# Patient Record
Sex: MALE | Employment: FULL TIME | URBAN - METROPOLITAN AREA
[De-identification: names, ages, dates, MRNs, and addresses within clinical notes are randomized per-mention and may not be internally consistent; named-entity substitution may affect disease eponyms.]

---

## 2017-12-15 ENCOUNTER — HOSPITAL ENCOUNTER (OUTPATIENT)
Dept: GENERAL RADIOLOGY | Age: 52
Discharge: HOME | End: 2017-12-15
Payer: COMMERCIAL

## 2017-12-15 DIAGNOSIS — M54.50 LOW BACK PAIN: ICD-10-CM

## 2017-12-15 PROCEDURE — 72100 X-RAY EXAM L-S SPINE 2/3 VWS: CPT

## 2017-12-21 ENCOUNTER — HOSPITAL ENCOUNTER (OUTPATIENT)
Dept: PHYSICAL THERAPY | Age: 52
Discharge: HOME | End: 2017-12-21
Payer: COMMERCIAL

## 2017-12-21 PROCEDURE — 97140 MANUAL THERAPY 1/> REGIONS: CPT

## 2017-12-21 PROCEDURE — 97112 NEUROMUSCULAR REEDUCATION: CPT

## 2017-12-21 PROCEDURE — 97162 PT EVAL MOD COMPLEX 30 MIN: CPT

## 2017-12-21 NOTE — PROGRESS NOTES
PT DAILY TREATMENT NOTE -    Patient Name: Daysi Cea  Date:2017  : 1965  [x]  Patient  Verified  Payor: BLUE CROSS / Plan: [facility] [place] / Product Type: PPO /    In time:945  Out time:1043  Total Treatment Time (min): 62  Visit #: 1 of 8    Treatment Area: Low back pain [M54.5]    SUBJECTIVE  Pain Level (0-10 scale): 4  Any medication changes, allergies to medications, adverse drug reactions, diagnosis change, or new procedure performed?: [x] No    [] Yes (see summary sheet for update)  Subjective functional status/changes:   [] No changes reported  Accident with back at 12 y/o - intermittent issues with back ever since. 5 weeks ago pain was exacerbated without injury. The only activity change was a decrease in frequency of going to the gym - now patient is unable to go to gym or tolerate >30 minutes of standing activity. Gym workout - heavy machine circuits- low rep, high weight. OBJECTIVE  23 min Neuromuscular Re-education:  [x]  See flow sheet :   Rationale: increase strength, improve coordination, improve balance and increase proprioception  to improve the patients ability to revive activation of anterior chain from c/s to l/s and unlock activation of TA.    8 min Manual Therapy:  (B) inferior and posterior hip mobilization to increase (B) hip flexion to assist with lumbar flexion ROM   Rationale: decrease pain, increase ROM and increase tissue extensibility to effectively stretch back extensors. With   [] TE   [] TA   [] neuro   [] other: Patient Education: [x] Review HEP    [] Progressed/Changed HEP based on:   [] positioning   [] body mechanics   [] transfers   [] heat/ice application    [] other:      Other Objective/Functional Measures: refer to eval     Pain Level (0-10 scale) post treatment: 1    ASSESSMENT/Changes in Function: Excellent response to flexion based approached. Unable to replicate any neuro symptoms.   Pt would benefit from PT to reset postural awareness and activate local muscle strength    Patient will continue to benefit from skilled PT services to modify and progress therapeutic interventions, address functional mobility deficits, address ROM deficits, address strength deficits, analyze and address soft tissue restrictions, analyze and cue movement patterns, analyze and modify body mechanics/ergonomics and assess and modify postural abnormalities to attain remaining goals. [x]  See Plan of Care  []  See progress note/recertification  []  See Discharge Summary         Progress towards goals / Updated goals:  Short Term Goals: To be accomplished in 2 weeks:  1. Pt will be compliant with HEP 2x/day to improve postural alignment in preparation for functional core training to affect standing tolerance for ADLs  2. Pt will be able to complete supine abdominal program consistently demonstrating a diaphragmatic breathing pattern to break thru compensatory patterns for greater functional activation of local muscles with standing activity.       Long Term Goals: To be accomplished in 4 weeks:  1. Pt will improve FOTO to 68 to resume safe working out in the gym  2. Pt will improve standing tolerance to > 1 hour to increase ease of ADLs  3. Pt will demonstrate a 5 cm difference in lower thoracic circumference between maximal exhalation and maximal inhalation to alleviate diaphragmatic spasm to activate TA during functional postures.       PLAN  []  Upgrade activities as tolerated     [x]  Continue plan of care  []  Update interventions per flow sheet       []  Discharge due to:_  []  Other:_      Donita Juarez PT 12/21/2017  11:00 AM    Future Appointments  Date Time Provider Charlie Louis   12/27/2017 12:00 PM Donita Juarez PT [facility]   12/29/2017 3:30 PM Kalina Lopez [facility]   1/3/2018 11:30 AM Donita Juarez PT [facility]   1/5/2018 11:30 AM Kalina Lopez [facility]   1/10/2018 11:30 AM Grace Dixon Norah, PT [facility]   1[facility]12/2018 [facility]:30 [facility]   1/17/2018 11:30 AM Cliff Cantu, PT [facility]

## 2017-12-21 NOTE — PROGRESS NOTES
[facility]  [address]  [place], [address]  [phone] fax    Plan of Care/ Statement of Necessity for Physical Therapy Services    Patient name: Taras Son Start of Care: 2017   Referral source: Giselle Mendoza MD : 1965    Medical Diagnosis: Low back pain [M54.5]   Onset Date:5 weeks ago    Treatment Diagnosis: back pain   Prior Hospitalization: see medical history Provider#: [id number]   Medications: Verified on Patient summary List    Comorbidities: psoriatic arthritis, back pain, HA, sleep dysfunction   Prior Level of Function: Unlimited standing tolerance, no LE symptoms. Recreational exerciser up unitl 2 months ago - heavy machine based weight circuits maxing out on doable weights for all global muscle groups. The Plan of Care and following information is based on the information from the initial evaluation. Assessment/ key information: Pt presents to PT with insidious onset of back pain with (B) LE involvement when standing for >30 minutes. Pt has significant postural abnormalities: forward head, dowagers hump, excessive thoracic kyphosis, and a hyperlordosis. He stands with abdominal wall pushed out without any evidence of TA activation or that of any anterior chain muscles. Neurological exam was WNL and there was no provocation of LE symptoms. Continue PT for neurological reboot of local muscle systems - anterior chain emphasis to prepare patient for functional strength training to eliminate LBP and to prepare patient to resume safe functional exercise.     Evaluation Complexity History MEDIUM  Complexity : 1-2 comorbidities / personal factors will impact the outcome/ POC ; Examination MEDIUM Complexity : 3 Standardized tests and measures addressing body structure, function, activity limitation and / or participation in recreation  ;Presentation MEDIUM Complexity : Evolving with changing characteristics ;Clinical Decision Making MEDIUM Complexity : FOTO score of 26-74  Overall Complexity Rating: MEDIUM  Problem List: pain affecting function, decrease ROM, decrease strength, edema affecting function, impaired gait/ balance, decrease ADL/ functional abilitiies, decrease activity tolerance and decrease flexibility/ joint mobility   Treatment Plan may include any combination of the following: Therapeutic exercise, Therapeutic activities, Neuromuscular re-education, Physical agent/modality, Gait/balance training, Manual therapy and Patient education  Patient / Family readiness to learn indicated by: asking questions, trying to perform skills and interest  Persons(s) to be included in education: patient (P)  Barriers to Learning/Limitations: None  Patient Goal (s): To be able to return to the gym.   Patient Self Reported Health Status: good  Rehabilitation Potential: good    Short Term Goals: To be accomplished in 2 weeks:  1. Pt will be compliant with HEP 2x/day to improve postural alignment in preparation for functional core training to affect standing tolerance for ADLs  2. Pt will be able to complete supine abdominal program consistently demonstrating a diaphragmatic breathing pattern to break thru compensatory patterns for greater functional activation of local muscles with standing activity. Long Term Goals: To be accomplished in 4 weeks:  1. Pt will improve FOTO to 68 to resume safe working out in the gym  2. Pt will improve standing tolerance to > 1 hour to increase ease of ADLs  3. Pt will demonstrate a 5 cm difference in lower thoracic circumference between maximal exhalation and maximal inhalation to alleviate diaphragmatic spasm to activate TA during functional postures. Frequency / Duration: Patient to be seen 2 times per week for 4 weeks.     Patient/ Caregiver education and instruction: Diagnosis, prognosis, self care, activity modification and exercises   [x]  Plan of care has been reviewed with JOSÉ MIGUEL Vasquez, PT 12/21/2017 10:50 AM    ________________________________________________________________________    I certify that the above Therapy Services are being furnished while the patient is under my care. I agree with the treatment plan and certify that this therapy is necessary.     [de-identified] Signature:____________________  Date:____________Time: _________    Please sign and return to [facility] [address] [facility] [place] [address] [place] [address] [address]  [phone] fax

## 2017-12-27 ENCOUNTER — HOSPITAL ENCOUNTER (OUTPATIENT)
Dept: PHYSICAL THERAPY | Age: 52
Discharge: HOME | End: 2017-12-27
Payer: COMMERCIAL

## 2017-12-27 PROCEDURE — 97112 NEUROMUSCULAR REEDUCATION: CPT

## 2017-12-27 NOTE — PROGRESS NOTES
PT DAILY TREATMENT NOTE 8-14    Patient Name: Tin Preston  Date:2017  : 1965  [x]  Patient  Verified  Payor: BLUE CROSS / Plan: [facility] [place] / Product Type: PPO /    In time:1201  Out time:1250  Total Treatment Time (min): 49  Visit #: 2 of 8    Treatment Area: Low back pain [M54.5]    SUBJECTIVE  Pain Level (0-10 scale): 1  Any medication changes, allergies to medications, adverse drug reactions, diagnosis change, or new procedure performed?: [x] No    [] Yes (see summary sheet for update)  Subjective functional status/changes:   [] No changes reported  I have intermittent pain still. I still loosen up in the shower the best.  Clarified some questions regarding HEP    OBJECTIVE    49 min Neuromuscular Re-education:  [x]  See flow sheet :   Rationale: increase strength, improve coordination and increase proprioception  to improve the patients ability to eliminate bracing, improve diaphragmatic movement/breathing, connect entire anterior chain from cervical to pelvic areas           With   [] TE   [] TA   [] neuro   [] other: Patient Education: [x] Review HEP    [] Progressed/Changed HEP based on:   [] positioning   [] body mechanics   [] transfers   [] heat/ice application    [] other:      Other Objective/Functional Measures: 50% improvement in diaphragmatic movement but unable to eliminate all bracing and compensatory breathing issues. Pt has significantly decreased proprioceptive awareness of his anterior chain from the diaphragm to the pubic symphysis. Therapist was able to connect and get appropriate firing but patient was not able to feel it and fatigued quite quickly with all exercises.        Pain Level (0-10 scale) post treatment: 0    ASSESSMENT/Changes in Function: Pt never had any LBP during treatment and reported all LE symptoms were abolished post treatment    Patient will continue to benefit from skilled PT services to modify and progress therapeutic interventions, address functional mobility deficits, address ROM deficits, address strength deficits, analyze and address soft tissue restrictions, analyze and cue movement patterns, analyze and modify body mechanics/ergonomics and assess and modify postural abnormalities to attain remaining goals.      []  See Plan of Care  []  See progress note/recertification  []  See Discharge Summary         Progress towards goals / Updated goals:  Short Term Goals: To be accomplished in 2 weeks:  1.  Pt will be compliant with HEP 2x/day to improve postural alignment in preparation for functional core training to affect standing tolerance for ADLs  2.  Pt will be able to complete supine abdominal program consistently demonstrating a diaphragmatic breathing pattern to break thru compensatory patterns for greater functional activation of local muscles with standing activity.        Long Term Goals: To be accomplished in 4 weeks:  1.  Pt will improve FOTO to 68 to resume safe working out in the gym  2.  Pt will improve standing tolerance to > 1 hour to increase ease of ADLs  3.  Pt will demonstrate a 5 cm difference in lower thoracic circumference between maximal exhalation and maximal inhalation to alleviate diaphragmatic spasm to activate TA during functional postures.      PLAN  []  Upgrade activities as tolerated     [x]  Continue plan of care  []  Update interventions per flow sheet       []  Discharge due to:_  []  Other:_      Domingo Lópze PT 12/27/2017  12:53 PM    Future Appointments  Date Time Provider Charlie Louis   12/29/2017 3:30 PM Jay GARCIAS [facility]   1/3/2018 11:30 AM SIMA Renee [facility]   1/5/2018 11:30 AM Jay GARCIAS [facility]   1/10/2018 11:30 AM SIMA Renee [facility]   1/12/2018 11:30 AM Jay BOLESPT [facility]   1/17/2018 11:30 AM SIMA [facility]eP[facility]

## 2017-12-29 ENCOUNTER — HOSPITAL ENCOUNTER (OUTPATIENT)
Dept: PHYSICAL THERAPY | Age: 52
Discharge: HOME | End: 2017-12-29
Payer: COMMERCIAL

## 2017-12-29 PROCEDURE — 97112 NEUROMUSCULAR REEDUCATION: CPT

## 2017-12-29 NOTE — PROGRESS NOTES
PT DAILY TREATMENT NOTE 3-16    Patient Name: Monica Suh  Date:2017  : 1965  [x]  Patient  Verified  Payor: BLUE CROSS / Plan:  [facility] [place] / Product Type: PPO /    In time:3:31  Out time:4:10  Total Treatment Time (min): 39  Visit #: 3 of 8    Treatment Area: Low back pain [M54.5]    SUBJECTIVE  Pain Level (0-10 scale): 3/10  Any medication changes, allergies to medications, adverse drug reactions, diagnosis change, or new procedure performed?: [x] No    [] Yes (see summary sheet for update)  Subjective functional status/changes:   [] No changes reported  \"It's a little sore. \"    OBJECTIVE  39 min Neuromuscular Re-education:  [x]  See flow sheet :   Rationale: increase strength, improve coordination, improve balance and increase proprioception  to improve the patients ability to improve anterior chain recruitment     With   [] TE   [] TA   [] neuro   [] other: Patient Education: [x] Review HEP    [] Progressed/Changed HEP based on:   [] positioning   [] body mechanics   [] transfers   [] heat/ice application    [] other:      Other Objective/Functional Measures:   Utilized Superman ball as space corrector with c/s activities    Pain Level (0-10 scale) post treatment: 3    ASSESSMENT/Changes in Function:   Continues to have great difficulty decreasing diaphragmatic spasm though demonstrates fair-good c/s postural form post cueing. Maximal cueing to peform all activites on exhale with flexion activities. Core fatigues very quickly. Patient will continue to benefit from skilled PT services to modify and progress therapeutic interventions, address functional mobility deficits, address ROM deficits, address strength deficits, analyze and address soft tissue restrictions, analyze and cue movement patterns, analyze and modify body mechanics/ergonomics and assess and modify postural abnormalities to attain remaining goals.      []  See Plan of Care  []  See progress note/recertification  [] See Discharge Summary         Progress towards goals / Updated goals:  Short Term Goals: To be accomplished in 2 weeks:  1.  Pt will be compliant with HEP 2x/day to improve postural alignment in preparation for functional core training to affect standing tolerance for ADLs -met per patient report (12/29/2017)  2.  Pt will be able to complete supine abdominal program consistently demonstrating a diaphragmatic breathing pattern to break thru compensatory patterns for greater functional activation of local muscles with standing activity.        Long Term Goals: To be accomplished in 4 weeks:  1.  Pt will improve FOTO to 68 to resume safe working out in the gym  2.  Pt will improve standing tolerance to > 1 hour to increase ease of ADLs  3.  Pt will demonstrate a 5 cm difference in lower thoracic circumference between maximal exhalation and maximal inhalation to alleviate diaphragmatic spasm to activate TA during functional postures.      PLAN  []  Upgrade activities as tolerated     [x]  Continue plan of care  []  Update interventions per flow sheet       []  Discharge due to:_  []  Other:_      Martin Ochoa 12/29/2017  3:28 PM    Future Appointments  Date Time Provider Charlie Louis   12/29/2017 3:30 PM Martin Ochoa [facility]   1/3/2018 11:30 AM Ceferino Loza PT [facility]   1/5/2018 11:30 AM Martin [facility]OL[facility]   1/10/2018 11:30 AM Ceferino Loza PT [facility]   1/12/2018 11:30 AM Martin [facility]   1/17/2018 11:30 AM Ceferino Loza PT [facility]

## 2018-01-03 ENCOUNTER — HOSPITAL ENCOUNTER (OUTPATIENT)
Dept: PHYSICAL THERAPY | Age: 53
Discharge: HOME | End: 2018-01-03
Payer: COMMERCIAL

## 2018-01-03 PROCEDURE — 97112 NEUROMUSCULAR REEDUCATION: CPT

## 2018-01-03 NOTE — PROGRESS NOTES
PT DAILY TREATMENT NOTE 8-    Patient Name: Rosalind Boo  Date:1/3/2018  : 1965  [x]  Patient  Verified  Payor: BLUE CROSS / Plan: [facility] [place] / Product Type: PPO /    In time:1147  Out time:1211  Total Treatment Time (min): 24  Visit #: 4 of 8    Treatment Area: Low back pain [M54.5]    SUBJECTIVE  Pain Level (0-10 scale): 1-2  Any medication changes, allergies to medications, adverse drug reactions, diagnosis change, or new procedure performed?: [x] No    [] Yes (see summary sheet for update)  Subjective functional status/changes:   [] No changes reported  I was pretty stiff in the lower back after last session but I've woken up feeling better today. Pt reports still feeling himself bracing through the diaphragm with ADLs and exercises - particularly during trunk flexion. Pt was very late so treatment was shortened. OBJECTIVE    24 min Neuromuscular Re-education:  [x]  See flow sheet :   Rationale: increase strength, improve coordination, improve balance and increase proprioception  to improve the patients ability to release diaphragm, activate TA and other deep anterior column stabilizers. With   [] TE   [] TA   [] neuro   [] other: Patient Education: [x] Review HEP    [] Progressed/Changed HEP based on:   [] positioning   [] body mechanics   [] transfers   [] heat/ice application    [] other:      Other Objective/Functional Measures:     Kept pt supine on Oov due to time constraints and to work with patient until TA started to activate and diaphragm started to relax. It took about 15 minutes of different movement patterns to get there and then patient's stamina was about 10 minutes. Due to late arrival, treatment ended there. Pt is starting to feel the difference between good and bad muscle recruitment.   We were also successful and repeating an exercise until he was successful and then using static holds of a difficult pattern until he could successfully control the movement, then practiced the movement more dynamically. Post treatment, there was volitional TA recruitment observed. Proprioceptively, patient is still challenged with perceiving the changes. Pain Level (0-10 scale) post treatment: 1    ASSESSMENT/Changes in Function: Pt reported lower back relaxation post treatment. No apparent changes to intermittent LE symptoms. Patient will continue to benefit from skilled PT services to modify and progress therapeutic interventions, address functional mobility deficits, address ROM deficits, address strength deficits, analyze and address soft tissue restrictions, analyze and cue movement patterns, analyze and modify body mechanics/ergonomics, assess and modify postural abnormalities and address imbalance/dizziness to attain remaining goals.      []  See Plan of Care  []  See progress note/recertification  []  See Discharge Summary         Progress towards goals / Updated goals:  Short Term Goals: To be accomplished in 2 weeks:  1.  Pt will be compliant with HEP 2x/day to improve postural alignment in preparation for functional core training to affect standing tolerance for ADLs -met per patient report (12/29/2017)  2.  Pt will be able to complete supine abdominal program consistently demonstrating a diaphragmatic breathing pattern to break thru compensatory patterns for greater functional activation of local muscles with standing activity.   Not met - 15 minutes to breakthrough habitual bracing and guarding techniques to successfully engage TA and reduce hip flexor and diaphragmatic compensation 1/3/2018      Long Term Goals: To be accomplished in 4 weeks:  1.  Pt will improve FOTO to 68 to resume safe working out in the gym  2.  Pt will improve standing tolerance to > 1 hour to increase ease of ADLs  3.  Pt will demonstrate a 5 cm difference in lower thoracic circumference between maximal exhalation and maximal inhalation to alleviate diaphragmatic spasm to activate TA during functional postures.      PLAN  []  Upgrade activities as tolerated     [x]  Continue plan of care  []  Update interventions per flow sheet       []  Discharge due to:_  []  Other:_      Khadra Martinez, PT 1/3/2018  1:33 PM    Future Appointments  Date Time Provider Charlie Louis   1/5/2018 11:30 AM Christina Hale [address] [facility]   1/10/2018 11:30 AM Khadra Martinez, PT [facility]   1/12/2018 11:30 AM Isabella Milan [facility]   1/17/2018 11:30 AM Khadra Martinez, PT [facility]

## 2018-01-05 ENCOUNTER — APPOINTMENT (OUTPATIENT)
Dept: PHYSICAL THERAPY | Age: 53
End: 2018-01-05
Payer: COMMERCIAL

## 2018-01-10 ENCOUNTER — HOSPITAL ENCOUNTER (OUTPATIENT)
Dept: PHYSICAL THERAPY | Age: 53
Discharge: HOME | End: 2018-01-10
Payer: COMMERCIAL

## 2018-01-10 PROCEDURE — 97112 NEUROMUSCULAR REEDUCATION: CPT

## 2018-01-10 NOTE — PROGRESS NOTES
PT DAILY TREATMENT NOTE 8-    Patient Name: Maribel Barrera  Date:1/10/2018  : 1965  [x]  Patient  Verified  Payor: BLUE CROSS / Plan: [employer] [facility] / Product Type: PPO /    In time:1107  Out time:1202  Total Treatment Time (min): 55  Visit #: 5 of 8    Treatment Area: Low back pain [M54.5]    SUBJECTIVE  Pain Level (0-10 scale): 1  Any medication changes, allergies to medications, adverse drug reactions, diagnosis change, or new procedure performed?: [x] No    [] Yes (see summary sheet for update)  Subjective functional status/changes:   [] No changes reported  I have been able to tolerate cooking and cleaning without noticing my back so I have to say there's been improvement in that. I cannot say that I feel my muscles working any differently. Therapist educated patient that the small muscles we are targeting will not be perceptible during activity and they should be activated autonomically. OBJECTIVE    55 min Neuromuscular Re-education:  [x]  See flow sheet :   Rationale: increase strength, improve coordination, improve balance and increase proprioception  to improve the patients ability to improve diaphragmatic mobilization, core connection, connection of upper and lower trunk, connection of extremities to trunk. With   [] TE   [] TA   [] neuro   [] other: Patient Education: [x] Review HEP    [] Progressed/Changed HEP based on:   [] positioning   [] body mechanics   [] transfers   [] heat/ice application    [] other:      Other Objective/Functional Measures: Better relaxation of diaphragm allowing better facilitation of anterior chain. Patient still has difficulty perceiving the changes and activation patterns. 30 minutes into the program, therapist was able to elicit perceivable activation of the lower abdominals. After that, pt was progressed through QP exercises on the reformer facing the rails and facing the foot bar.          Pain Level (0-10 scale) post treatment: 0    ASSESSMENT/Changes in Function: No pain after treatment and no LBP in compensation of lower abdominal isolation work. In the first 1/2 of the program, patient was getting aggravation of the hip flexors, second 1/2 we were able to consistently fire the lower abdominals and TA to volitional fatigue. Pt has improved upper abdominal strength and activation significantly. Patient will continue to benefit from skilled PT services to modify and progress therapeutic interventions, address functional mobility deficits, address ROM deficits, address strength deficits, analyze and address soft tissue restrictions, analyze and cue movement patterns, analyze and modify body mechanics/ergonomics and assess and modify postural abnormalities to attain remaining goals.      []  See Plan of Care  []  See progress note/recertification  []  See Discharge Summary         Progress towards goals / Updated goals:  Short Term Goals: To be accomplished in 2 weeks:  1.  Pt will be compliant with HEP 2x/day to improve postural alignment in preparation for functional core training to affect standing tolerance for ADLs -met per patient report (12/29/2017)  2.  Pt will be able to complete supine abdominal program consistently demonstrating a diaphragmatic breathing pattern to break thru compensatory patterns for greater functional activation of local muscles with standing activity.   Not met - 15 minutes to breakthrough habitual bracing and guarding techniques to successfully engage TA and reduce hip flexor and diaphragmatic compensation 1/3/2018      Long Term Goals: To be accomplished in 4 weeks:  1.  Pt will improve FOTO to 68 to resume safe working out in the gym  2.  Pt will improve standing tolerance to > 1 hour to increase ease of ADLs  Progress noted - pt reports being able to tolerate standing to cook a complete meal and clean up dishes without experience LBP 1/10/2018  3.  Pt will demonstrate a 5 cm difference in lower thoracic circumference between maximal exhalation and maximal inhalation to alleviate diaphragmatic spasm to activate TA during functional postures.   Not met - after practicing balloon breathing, excursion is only 1.5 cm 1/10/2018    PLAN  []  Upgrade activities as tolerated     [x]  Continue plan of care  []  Update interventions per flow sheet       []  Discharge due to:_  []  Other:_      Brent Herrmann, PT 1/10/2018  12:10 PM    Future Appointments  Date Time Provider Charlie Louis   1/12/2018 11:30 AM [facility]   1/17/2018 11:30 AM [facility]

## 2018-01-12 ENCOUNTER — APPOINTMENT (OUTPATIENT)
Dept: PHYSICAL THERAPY | Age: 53
End: 2018-01-12
Payer: COMMERCIAL

## 2018-01-17 ENCOUNTER — APPOINTMENT (OUTPATIENT)
Dept: PHYSICAL THERAPY | Age: 53
End: 2018-01-17
Payer: COMMERCIAL

## 2018-01-24 ENCOUNTER — HOSPITAL ENCOUNTER (OUTPATIENT)
Dept: PHYSICAL THERAPY | Age: 53
Discharge: HOME | End: 2018-01-24
Payer: COMMERCIAL

## 2018-01-24 PROCEDURE — 97112 NEUROMUSCULAR REEDUCATION: CPT

## 2018-01-24 NOTE — PROGRESS NOTES
PT DAILY TREATMENT NOTE 3-16    Patient Name: Reji Enamorado  Date:2018  : 1965  [x]  Patient  Verified  Payor: BLUE CROSS / Plan: [employer] [place] [facility] / Product Type: PPO /    In time:11:03  Out time:11:42  Total Treatment Time (min): 39  Visit #: 6 of 8    Treatment Area: Low back pain [M54.5]    SUBJECTIVE  Pain Level (0-10 scale): 3  Any medication changes, allergies to medications, adverse drug reactions, diagnosis change, or new procedure performed?: [x] No    [] Yes (see summary sheet for update)  Subjective functional status/changes:   [] No changes reported  Patient reports increased pain secondary to recent vacation to [facility] and having to walk around a lot. Patient reports HEP noncompliance over vacation. OBJECTIVE  39 min Neuromuscular Re-education:  [x]  See flow sheet :   Rationale: increase ROM, increase strength, improve coordination, improve balance and increase proprioception  to improve the patients ability to increase ease with eventual return to gym activities            With   [] TE   [] TA   [] neuro   [] other: Patient Education: [x] Review HEP    [] Progressed/Changed HEP based on:   [] positioning   [] body mechanics   [] transfers   [] heat/ice application    [] other:      Other Objective/Functional Measures: FOTO:54     Slightly improved anterior chain activation--presents with decreased rib flar with nod and lift with supine OOV intervention    Pain Level (0-10 scale) post treatment: 1    ASSESSMENT/Changes in Function:   Patient with quick abominal fatigue and has difficulty activating TA today with patient's recent break in therapy sessions most likely contributing. He reports increased pain secondary to prolonged ambulation (x6 miles a day) over vacation. Patient would continue to benefit from continued, skilled PT to improve local muscle activation and ease with eventual return to gym activities.      Patient will continue to benefit from skilled PT services to modify and progress therapeutic interventions, address functional mobility deficits, address ROM deficits, address strength deficits, analyze and address soft tissue restrictions, analyze and cue movement patterns, analyze and modify body mechanics/ergonomics and assess and modify postural abnormalities to attain remaining goals.      []  See Plan of Care  [x]  See progress note/recertification  []  See Discharge Summary         Progress towards goals / Updated goals:  Short Term Goals: To be accomplished in 2 weeks:  1.  Pt will be compliant with HEP 2x/day to improve postural alignment in preparation for functional core training to affect standing tolerance for ADLs -met per patient report (12/29/2017)  2.  Pt will be able to complete supine abdominal program consistently demonstrating a diaphragmatic breathing pattern to break thru compensatory patterns for greater functional activation of local muscles with standing activity.   Not met - 15 minutes to breakthrough habitual bracing and guarding techniques to successfully engage TA and reduce hip flexor and diaphragmatic compensation 1/3/2018      Long Term Goals: To be accomplished in 4 weeks:  1.  Pt will improve FOTO to 68 to resume safe working out in the gym.-not met, decrease by 3 points (1/24/2018)  2.  Pt will improve standing tolerance to > 1 hour to increase ease of ADLs  Progress noted - pt reports being able to tolerate standing to cook a complete meal and clean up dishes without experience LBP 1/10/2018  3.  Pt will demonstrate a 5 cm difference in lower thoracic circumference between maximal exhalation and maximal inhalation to alleviate diaphragmatic spasm to activate TA during functional postures.   Not met - after practicing balloon breathing, excursion is only 1.5 cm 1/10/2018    PLAN  [x]  Upgrade activities as tolerated     [x]  Continue plan of care  []  Update interventions per flow sheet       []  Discharge due to:_  []  Other:_ Moraima Darnell 1/24/2018  11:06 AM    No future appointments.

## 2018-01-25 NOTE — PROGRESS NOTES
[facility]  [address]  [place], [address]  [phone] fax    Physical Therapy Progress Note  Patient name: Casey Kc Start of Care: 2017   Referral source: Tyrone Barr MD : 1965                          Medical Diagnosis: Low back pain [M54.5] Onset Date:5 weeks ago                          Treatment Diagnosis: back pain   Prior Hospitalization: see medical history Provider#: [id number]   Medications: Verified on Patient summary List    Comorbidities: psoriatic arthritis, back pain, HA, sleep dysfunction   Prior Level of Function: Unlimited standing tolerance, no LE symptoms. Recreational exerciser up unitl 2 months ago - heavy machine based weight circuits maxing out on doable weights for all global muscle groups. Visits from Start of Care: 6    Missed Visits: 2    [facility] has been interrupted due to weather and personal vacations which included 6 miles of walking per day.     Progress towards goals / Updated goals:  Short Term Goals: To be accomplished in 2 weeks:  1.  Pt will be compliant with HEP 2x/day to improve postural alignment in preparation for functional core training to affect standing tolerance for ADLs -met per patient report (2017)  2.  Pt will be able to complete supine abdominal program consistently demonstrating a diaphragmatic breathing pattern to break thru compensatory patterns for greater functional activation of local muscles with standing activity.   Not met - 15 minutes to breakthrough habitual bracing and guarding techniques to successfully engage TA and reduce hip flexor and diaphragmatic compensation 1/3/2018      Long Term Goals: To be accomplished in 4 weeks:  1.  Pt will improve FOTO to 68 to resume safe working out in the gym.-not met, decrease by 3 points (2018)  2.  Pt will improve standing tolerance to > 1 hour to increase ease of ADLs  Progress noted - pt reports being able to tolerate standing to cook a complete meal and clean up dishes without experience LBP 1/10/2018  3.  Pt will demonstrate a 5 cm difference in lower thoracic circumference between maximal exhalation and maximal inhalation to alleviate diaphragmatic spasm to activate TA during functional postures.   Not met - after practicing balloon breathing, excursion is only 1.5 cm 1/10/2018    Updated Goals: to be achieved in 4 weeks:  1. Pt will improve FOTO to 68 to increase ease of ADLs  2. Pt will improve standing tolerance to 2 hours to increase ease of ADLs  3. Pt will demonstrate a 3 cm difference in lower thoracic circumference between maximal exhalation and maximal inhalation to alleviate diaphragmatic spasm to activate TA during functional postures. ASSESSMENT/RECOMMENDATIONS:  [x]Continue therapy per initial plan/protocol at a frequency of  2 x per week for 4 weeks  []Continue therapy with the following recommended changes:_____________________      _____________________________________________________________________  []Discontinue therapy progressing towards or have reached established goals  []Discontinue therapy due to lack of appreciable progress towards goals  []Discontinue therapy due to lack of attendance or compliance  []Await Physician's recommendations/decisions regarding therapy  []Other:________________________________________________________________    Thank you for this referral.    Hugo Al, PT 1/25/2018 1:21 PM  NOTE TO PHYSICIAN:  [address]   [facility]: [phone]  If you are unable to process this request in 24 hours please contact our office: [phone]    ? I have read the above report and request that my patient continue as recommended.   ? I have read the above report and request that my patient continue therapy with the following changes/special instructions:__________________________________________________________  ? I have read the above report and request that my patient be discharged from therapy.     Physicians signature: ______________________________Date: ______Time:______

## 2018-01-31 ENCOUNTER — HOSPITAL ENCOUNTER (OUTPATIENT)
Dept: PHYSICAL THERAPY | Age: 53
Discharge: HOME | End: 2018-01-31
Payer: COMMERCIAL

## 2018-01-31 PROCEDURE — 97112 NEUROMUSCULAR REEDUCATION: CPT

## 2018-01-31 NOTE — PROGRESS NOTES
PT DAILY TREATMENT NOTE     Patient Name: Larry Justice  Date:2018  : 1965  [x]  Patient  Verified  Payor: BLUE CROSS / Plan:  [facility] / Product Type: PPO /    In time:1236  Out time:114  Total Treatment Time (min): 38  Visit #: 1 of 8    Treatment Area: Low back pain [M54.5]    SUBJECTIVE  Pain Level (0-10 scale): 0-1  Any medication changes, allergies to medications, adverse drug reactions, diagnosis change, or new procedure performed?: [x] No    [] Yes (see summary sheet for update)  Subjective functional status/changes:   [x] No changes reported      OBJECTIVE    38 min Neuromuscular Re-education:  []  See flow sheet :   Rationale: increase ROM, increase strength, improve coordination, improve balance and increase proprioception  to improve the patients diaphragmatic breathing/rib mobility, and postural awareness          With   [] TE   [] TA   [] neuro   [] other: Patient Education: [x] Review HEP    [] Progressed/Changed HEP based on:   [] positioning   [] body mechanics   [] transfers   [] heat/ice application    [] other:      Other Objective/Functional Measures:      Pain Level (0-10 scale) post treatment: 0    ASSESSMENT/Changes in Function: Continues to fatigue quickly with supine TA static exercises. Fairly good segmental mobility noted with typewriter below L2. Patient will continue to benefit from skilled PT services to modify and progress therapeutic interventions, address functional mobility deficits, address ROM deficits, address strength deficits, analyze and address soft tissue restrictions, analyze and cue movement patterns and assess and modify postural abnormalities to attain remaining goals. []  See Plan of Care  []  See progress note/recertification  []  See Discharge Summary         Progress towards goals / Updated goals:  1. Pt will improve FOTO to 68 to increase ease of ADLs  2.   Pt will improve standing tolerance to 2 hours to increase ease of ADLs  3.   Pt will demonstrate a 3 cm difference in lower thoracic circumference between maximal exhalation and maximal inhalation to alleviate diaphragmatic spasm to activate TA during functional postures.        PLAN  [x]  Upgrade activities as tolerated     []  Continue plan of care  []  Update interventions per flow sheet       []  Discharge due to:_  []  Other:_      Owen Jaquez, PT 1/31/2018  2:29 PM    Future Appointments  Date Time Provider Charlie Godinezi   2/2/2018 12:00 PM Eleonora Quintero, PT [facility]

## 2018-02-02 ENCOUNTER — HOSPITAL ENCOUNTER (OUTPATIENT)
Dept: PHYSICAL THERAPY | Age: 53
Discharge: HOME | End: 2018-02-02
Payer: COMMERCIAL

## 2018-02-02 PROCEDURE — 97112 NEUROMUSCULAR REEDUCATION: CPT

## 2018-02-02 NOTE — PROGRESS NOTES
PT DAILY TREATMENT NOTE 8-    Patient Name: Abbie Ramirez  Date:2018  : 1965  [x]  Patient  Verified  Payor: BLUE CROSS / Plan: [employer] [facility] / Product Type: PPO /    In time:1211  Out time:1247  Total Treatment Time (min): 36  Visit #: 2 of 8    Treatment Area: Low back pain [M54.5]    SUBJECTIVE  Pain Level (0-10 scale): 0  Any medication changes, allergies to medications, adverse drug reactions, diagnosis change, or new procedure performed?: [x] No    [] Yes (see summary sheet for update)  Subjective functional status/changes:   [] No changes reported  I feel okay now. I really messed myself up going to [employer]. Pt reports his lower abdominals were sore for the first time after the last session. OBJECTIVE  36 min Neuromuscular Re-education:  [x]  See flow sheet :   Rationale: increase strength, improve coordination, improve balance and increase proprioception  to improve the patients ability to activate deep local muscle system to protect lumbar spine and increase tolerance to standing ADLs             With   [] TE   [] TA   [] neuro   [] other: Patient Education: [x] Review HEP    [] Progressed/Changed HEP based on:   [] positioning   [] body mechanics   [] transfers   [] heat/ice application    [] other:      Other Objective/Functional Measures:  Pt is improving with TA activation on the right side and consistently firing. He still cannot fire TA on the left - throughout treatment left side was hypofunctioning. Had to sit patient high on trap table for UE TA arm springs to turn off hip flexors and erector spinae. Long axis compression on reformer - able to get TA activation right side, nearly nothing with left TA and pt reported serious quad fatigue with leg raising (B) secondary to habitual distal to proximal firing pattern. Pain Level (0-10 scale) post treatment: 0    ASSESSMENT/Changes in Function: Pt feels great when he leaves.   With some movements, entire abdominal wall is engaging with less rectus bulging. When left side is performing heavy isolation exercise there is nearly no activation of TA with longer lever arm. As the lever arm decreases and torque reduces, left TA engages. We may need to adjust lever arms to get facilitation and strengthen from there    Patient will continue to benefit from skilled PT services to modify and progress therapeutic interventions, address functional mobility deficits, address ROM deficits, address strength deficits, analyze and address soft tissue restrictions, analyze and cue movement patterns, analyze and modify body mechanics/ergonomics and assess and modify postural abnormalities to attain remaining goals. []  See Plan of Care  []  See progress note/recertification  []  See Discharge Summary         Progress towards goals / Updated goals:  1.  Pt will improve FOTO to 68 to increase ease of ADLs  2.  Pt will improve standing tolerance to 2 hours to increase ease of ADLs  3.  Pt will demonstrate a 3 cm difference in lower thoracic circumference between maximal exhalation and maximal inhalation to alleviate diaphragmatic spasm to activate TA during functional postures.   Not met - remains 2 cm with maximal effort 2/2/2018    PLAN  []  Upgrade activities as tolerated     [x]  Continue plan of care  []  Update interventions per flow sheet       []  Discharge due to:_  []  Other:_      Pawel Hines, PT 2/2/2018  1:28 PM    Future Appointments  Date Time Provider Charlie Louis   2/5/2018 2:00 PM [facility]al [facility]   2/7/2018 3:30 PM [address] [facility]   2/12/2018 3:30 PM [facility]   2/14/2018 3:30 PM [facility] [address] [facility]   2/19/2018 3:30 PM [facility]   2/21/2018 3:30 PM [facility]

## 2018-02-05 ENCOUNTER — HOSPITAL ENCOUNTER (OUTPATIENT)
Dept: PHYSICAL THERAPY | Age: 53
Discharge: HOME | End: 2018-02-05
Payer: COMMERCIAL

## 2018-02-05 PROCEDURE — 97112 NEUROMUSCULAR REEDUCATION: CPT

## 2018-02-05 NOTE — PROGRESS NOTES
PT DAILY TREATMENT NOTE 3-16    Patient Name: Theodora Pradhan  Date:2018  : 1965  [x]  Patient  Verified  Payor: BLUE CROSS / Plan: [employer] [facility] / Product Type: PPO /    In time:1:58  Out time:2:43  Total Treatment Time (min): 45  Visit #: 3 of 8    Treatment Area: Low back pain [M54.5]    SUBJECTIVE  Pain Level (0-10 scale): 1  Any medication changes, allergies to medications, adverse drug reactions, diagnosis change, or new procedure performed?: [x] No    [] Yes (see summary sheet for update)  Subjective functional status/changes:   [] No changes reported  \"I have a hard time feeling when my abs are engaged. \"    OBJECTIVE  45 min Neuromuscular Re-education:  [x]  See flow sheet :   Rationale: increase strength, improve coordination, improve balance and increase proprioception  to improve the patients ability to improve core activation, improve overall body awareness, and to improve connection between breathe and             With   [] TE   [] TA   [] neuro   [] other: Patient Education: [x] Review HEP    [] Progressed/Changed HEP based on:   [] positioning   [] body mechanics   [] transfers   [] heat/ice application    [] other:      Other Objective/Functional Measures:   With visual cueing: reformer headrest propped up and deflated ball under head--patient is able to improve TA activation, though presents with intermittent rectus bulge    Long axis compression on reformer---continues to have difficulty activating left TA     Pain Level (0-10 scale) post treatment: 0    ASSESSMENT/Changes in Function:   Patient requires constant cueing to avoid compensatory strategies---he will either dump into PPT with decreased rib flaring or vice versa with increased rib flaring and better neutral spine.  TA activation fatigues quickly with reformer activities; however, by end of treatment, patient does fairly well with seated on arc and box on trap table as evidenced with no reports/observationally hip flexor over recruitment. Patient will continue to benefit from skilled PT services to modify and progress therapeutic interventions, address functional mobility deficits, address ROM deficits, address strength deficits, analyze and address soft tissue restrictions, analyze and cue movement patterns, analyze and modify body mechanics/ergonomics and assess and modify postural abnormalities to attain remaining goals. []  See Plan of Care  []  See progress note/recertification  []  See Discharge Summary         Progress towards goals / Updated goals:  1.  Pt will improve FOTO to 68 to increase ease of ADLs  2.  Pt will improve standing tolerance to 2 hours to increase ease of ADLs  3.  Pt will demonstrate a 3 cm difference in lower thoracic circumference between maximal exhalation and maximal inhalation to alleviate diaphragmatic spasm to activate TA during functional postures.   Not met - remains 2 cm with maximal effort 2/2/2018    PLAN  []  Upgrade activities as tolerated     [x]  Continue plan of care  []  Update interventions per flow sheet       []  Discharge due to:_  []  Other:_      Paddy Cowper 2/5/2018  2:43 PM    Future Appointments  Date Time Provider Charlie Louis   2/7/2018 3:30 PM [facility] [address] [facility]   2/12/2018 3:30 PM Wilson Taylor, PT [facility]   2/14/2018 3:30 PM [address] [facility]   2/19/2018 3:30 PM Erncooper Taylor, PT [facility]   2/21/2018 3:30 PM Paddy Cowper [facility]

## 2018-02-07 ENCOUNTER — HOSPITAL ENCOUNTER (OUTPATIENT)
Dept: PHYSICAL THERAPY | Age: 53
Discharge: HOME | End: 2018-02-07
Payer: COMMERCIAL

## 2018-02-07 PROCEDURE — 97112 NEUROMUSCULAR REEDUCATION: CPT

## 2018-02-07 PROCEDURE — 97110 THERAPEUTIC EXERCISES: CPT

## 2018-02-07 NOTE — PROGRESS NOTES
PT DAILY TREATMENT NOTE 3-16    Patient Name: Kingston Messer  Date:2018  : 1965  [x]  Patient  Verified  Payor: BLUE CROSS / Plan: [employer] [facility] / Product Type: PPO /    In time:3:41  Out time:4:14  Total Treatment Time (min): 33  Visit #: 4 of 8    Treatment Area: Low back pain [M54.5]    SUBJECTIVE  Pain Level (0-10 scale): 0  Any medication changes, allergies to medications, adverse drug reactions, diagnosis change, or new procedure performed?: [x] No    [] Yes (see summary sheet for update)  Subjective functional status/changes:   [] No changes reported  \"No pain, just some stiffness. \"    OBJECTIVE  25 min Neuromuscular Re-education:  [x]  See flow sheet :   Rationale: increase ROM, increase strength, improve coordination, improve balance and increase proprioception  to improve the patients ability to improve core activation, improve overall body awareness, and and improve connection between TA and pelvic floor     8 min Therapeutic Exercise:  [x] See flow sheet :    Rationale: increase ROM, increase strength and improve coordination to improve the patients ability to improve LE flexibility      With   [] TE   [] TA   [] neuro   [] other: Patient Education: [x] Review HEP    [] Progressed/Changed HEP based on:   [] positioning   [] body mechanics   [] transfers   [] heat/ice application    [] other:      Other Objective/Functional Measures: Added reformer footwork with head propped up so that patient can visually see if he is performing rectus bulge     Pain Level (0-10 scale) post treatment: 0    ASSESSMENT/Changes in Function:   Slightly improved awareness of breathe and uses exhalations to facilitate movement. Continues with significant rectus bulge as soon as activities progress. Slow progress with spinal articulation--especially around t/s region.     Patient will continue to benefit from skilled PT services to modify and progress therapeutic interventions, address functional mobility deficits, address ROM deficits, address strength deficits, analyze and address soft tissue restrictions, analyze and cue movement patterns, analyze and modify body mechanics/ergonomics and assess and modify postural abnormalities to attain remaining goals.      []  See Plan of Care  []  See progress note/recertification  []  See Discharge Summary         Progress towards goals / Updated goals:  1.  Pt will improve FOTO to 68 to increase ease of ADLs  2.  Pt will improve standing tolerance to 2 hours to increase ease of ADLs  3.  Pt will demonstrate a 3 cm difference in lower thoracic circumference between maximal exhalation and maximal inhalation to alleviate diaphragmatic spasm to activate TA during functional postures.  Not met - remains 2 cm with maximal effort 2/2/2018    PLAN  []  Upgrade activities as tolerated     [x]  Continue plan of care  []  Update interventions per flow sheet       []  Discharge due to:_  []  Other:_      Jim Matthew 2/7/2018  3:42 PM    Future Appointments  Date Time Provider Charlie Louis   2[facility]12/2018 3:30 [facility]   2/14/2018 3:30 PM Christina [address] [facility]   2/19/2018 3:30 PM Justo Frankel PT [facility]   2/21/2018 3:30 PM [facility]

## 2018-02-12 ENCOUNTER — HOSPITAL ENCOUNTER (OUTPATIENT)
Dept: PHYSICAL THERAPY | Age: 53
Discharge: HOME | End: 2018-02-12
Payer: COMMERCIAL

## 2018-02-12 PROCEDURE — 97112 NEUROMUSCULAR REEDUCATION: CPT

## 2018-02-12 NOTE — PROGRESS NOTES
PT DAILY TREATMENT NOTE 8-    Patient Name: Esther Nj  Date:2018  : 1965  [x]  Patient  Verified  Payor: BLUE CROSS / Plan: [employer] [facility] [place] / Product Type: PPO /    In time:330  Out time:409  Total Treatment Time (min): 39  Visit #: 5 of 8    Treatment Area: Low back pain [M54.5]    SUBJECTIVE  Pain Level (0-10 scale): 3  Any medication changes, allergies to medications, adverse drug reactions, diagnosis change, or new procedure performed?: [x] No    [] Yes (see summary sheet for update)  Subjective functional status/changes:   [] No changes reported  I'm sore today because I tweaked my back - don't know what I did. Pt reported not knowing standing/walking tolerance and reports he has been babying his back. Requested he challenge endurance and test it by starting a walking program and engaging in static standing activities. OBJECTIVE  39 min Neuromuscular Re-education:  [x]  See flow sheet :   Rationale: increase ROM, increase strength, improve coordination and increase proprioception  to improve the patients ability to improve deep core activation             With   [] TE   [] TA   [] neuro   [] other: Patient Education: [x] Review HEP    [] Progressed/Changed HEP based on:   [] positioning   [] body mechanics   [] transfers   [] heat/ice application    [] other:      Other Objective/Functional Measures: Continues to lack ability to continually move breath with trunk flexion. That being said, therapist consistently got full activation/engagement of TA and rectus with all exercises except supine reformer work. Even with visual cueing, pt continued to disengage and brace with diaphragm     Pain Level (0-10 scale) post treatment: 0    ASSESSMENT/Changes in Function: Pt consistently reports feeling great post treatment. Pt will start to functionally challenge endurance/strength outside of clinic to assess functional carryover.      Patient will continue to benefit from skilled PT services to modify and progress therapeutic interventions, address functional mobility deficits, address ROM deficits, address strength deficits, analyze and address soft tissue restrictions, analyze and cue movement patterns, analyze and modify body mechanics/ergonomics and assess and modify postural abnormalities to attain remaining goals.      []  See Plan of Care  []  See progress note/recertification  []  See Discharge Summary         Progress towards goals / Updated goals:    1.  Pt will improve FOTO to 68 to increase ease of ADLs  2.  Pt will improve standing tolerance to 2 hours to increase ease of ADLs  3.  Pt will demonstrate a 3 cm difference in lower thoracic circumference between maximal exhalation and maximal inhalation to alleviate diaphragmatic spasm to activate TA during functional postures.  Not met - remains 2 cm with maximal effort 2/2/2018  PLAN  []  Upgrade activities as tolerated     [x]  Continue plan of care  []  Update interventions per flow sheet       []  Discharge due to:_  []  Other:_      Sherlon Goldberg, PT 2/12/2018  4:53 PM    Future Appointments  Date Time Provider Charlie Louis   2/14/2018 3:30 PM Christina [address] [facility]   2/19/2018 3:30 PM [facility]   2/21/2018 3:30 PM [facility]

## 2018-02-14 ENCOUNTER — HOSPITAL ENCOUNTER (OUTPATIENT)
Dept: PHYSICAL THERAPY | Age: 53
Discharge: HOME | End: 2018-02-14
Payer: COMMERCIAL

## 2018-02-14 PROCEDURE — 97112 NEUROMUSCULAR REEDUCATION: CPT

## 2018-02-14 NOTE — PROGRESS NOTES
PT DAILY TREATMENT NOTE 3-16    Patient Name: Airam Julien  Date:2018  : 1965  [x]  Patient  Verified  Payor: BLUE CROSS / Plan: [employer] [facility] [place] / Product Type: PPO /    In time:3:33  Out time:4:11  Total Treatment Time (min): 38  Visit #: 6 of 8    Treatment Area: Low back pain [M54.5]    SUBJECTIVE  Pain Level (0-10 scale): 2  Any medication changes, allergies to medications, adverse drug reactions, diagnosis change, or new procedure performed?: [x] No    [] Yes (see summary sheet for update)  Subjective functional status/changes:   [] No changes reported  \"Today's a good day. \"    Patient reports being able to tolerate standing x 20 minutes. OBJECTIVE  38 min Neuromuscular Re-education:  [x]  See flow sheet :   Rationale: increase ROM, increase strength, improve coordination and increase proprioception  to improve the patients ability to improve deep core activation            With   [] TE   [] TA   [] neuro   [] other: Patient Education: [x] Review HEP    [] Progressed/Changed HEP based on:   [] positioning   [] body mechanics   [] transfers   [] heat/ice application    [] other:      Other Objective/Functional Measures:   Continues to have the most difficulty engaging TA in supine positiion     Pain Level (0-10 scale) post treatment: 2    ASSESSMENT/Changes in Function:   Patient is making slow progress towards updated goals. Patient reports that PT has been helping, but when he is not in sessions for a while, his back again begins to hurt---patient with noncompliance HEP contributing. Therapist educates patient on importance of being compliant with HEP for self-independence. Consider D/C in upcoming visits with extensive updated HEP due to patient's current plateau.      Patient will continue to benefit from skilled PT services to modify and progress therapeutic interventions, address functional mobility deficits, address ROM deficits, address strength deficits, analyze and address soft tissue restrictions, analyze and cue movement patterns, analyze and modify body mechanics/ergonomics and assess and modify postural abnormalities to attain remaining goals. []  See Plan of Care  []  See progress note/recertification  []  See Discharge Summary         Progress towards goals / Updated goals:     1.  Pt will improve FOTO to 68 to increase ease of ADLs  2.  Pt will improve standing tolerance to 2 hours to increase ease of ADLs. -not met, patient reports 20 minutes (2/14/2018)  3.  Pt will demonstrate a 3 cm difference in lower thoracic circumference between maximal exhalation and maximal inhalation to alleviate diaphragmatic spasm to activate TA during functional postures.  Not met - remains 2 cm with maximal effort 2/2/2018    PLAN  []  Upgrade activities as tolerated     [x]  Continue plan of care  []  Update interventions per flow sheet       []  Discharge due to:_  []  Other:_      Mynor Vernon 2/14/2018  3:34 PM    Future Appointments  Date Time Provider Charlie Louis   2/19/2018 3:30 PM [facility]   2/21/2018 3:30 PM Mynor Vernon [facility]

## 2018-02-19 ENCOUNTER — HOSPITAL ENCOUNTER (OUTPATIENT)
Dept: PHYSICAL THERAPY | Age: 53
Discharge: HOME | End: 2018-02-19
Payer: COMMERCIAL

## 2018-02-19 PROCEDURE — 97112 NEUROMUSCULAR REEDUCATION: CPT

## 2018-02-19 NOTE — PROGRESS NOTES
PT DAILY TREATMENT NOTE 8-    Patient Name: Abbie Ramierz  Date:2018  : 1965  [x]  Patient  Verified  Payor: BLUE CROSS / [facility]: [facility] / Product Type: PPO /    In time:335  Out time:414  Total Treatment Time (min): 39  Visit #: 7 of 8    Treatment Area: Low back pain [M54.5]    SUBJECTIVE  Pain Level (0-10 scale): 0-1  Any medication changes, allergies to medications, adverse drug reactions, diagnosis change, or new procedure performed?: [x] No    [] Yes (see summary sheet for update)  Subjective functional status/changes:   [] No changes reported  I must say I do see some improvement. It's 50% easier to put on my shoes and I spent hours on my boat this weekend and I can't say my back bothered me. Suggested we do 1x/week for 4 weeks and suggested that pt see a spine specialist to offer more suggestions for pain management as patient continues to work on strengthening. OBJECTIVE  39 min Neuromuscular Re-education:  [x]  See flow sheet :   Rationale: increase strength, improve coordination, improve balance and increase proprioception  to improve the patients ability to protect the spine           With   [] TE   [] TA   [] neuro   [] other: Patient Education: [x] Review HEP    [] Progressed/Changed HEP based on:   [] positioning   [] body mechanics   [] transfers   [] heat/ice application    [] other:      Other Objective/Functional Measures:    Exercises: Supine reformer feet in staps and foot work laying on [employer] with numerous variations. Supine Pilates Spine corrector with arms in 2Y springs, attempted legs in springs - unable to hold or lift up legs     Overall - pt initiated instant abdominal activation and was able to maintain it throughtout treatment. There was less tendency to hold breath or feel like he couldn't move his diaphragm. There are obvious lower abdominal strength deficits to address.     Pain Level (0-10 scale) post treatment: 0    ASSESSMENT/Changes in Function: Assess FOTO score next visit and suggest a 4 week trial at 1x/week. Will give pt MD information to see a specialist    Patient will continue to benefit from skilled PT services to modify and progress therapeutic interventions, address functional mobility deficits, address ROM deficits, address strength deficits, analyze and address soft tissue restrictions, analyze and cue movement patterns, analyze and modify body mechanics/ergonomics and assess and modify postural abnormalities to attain remaining goals. []  See Plan of Care  []  See progress note/recertification  []  See Discharge Summary         Progress towards goals / Updated goals:  1.  Pt will improve FOTO to 68 to increase ease of ADLs  2.  Pt will improve standing tolerance to 2 hours to increase ease of ADLs. -not met, patient reports 20 minutes (2/14/2018)  3.  Pt will demonstrate a 3 cm difference in lower thoracic circumference between maximal exhalation and maximal inhalation to alleviate diaphragmatic spasm to activate TA during functional postures.  Not met - remains 2 cm with maximal effort 2/2/2018;  Improved to 3 CM at the end of the workout 2/19/2018    PLAN  []  Upgrade activities as tolerated     [x]  Continue plan of care  []  Update interventions per flow sheet       []  Discharge due to:_  []  Other:_      Arthur Self, PT 2/19/2018  5:04 PM    Future Appointments  Date Time Provider Charlie Louis   2/21/2018 3:30 PM [facility]

## 2018-02-21 ENCOUNTER — HOSPITAL ENCOUNTER (OUTPATIENT)
Dept: PHYSICAL THERAPY | Age: 53
Discharge: HOME | End: 2018-02-21
Payer: COMMERCIAL

## 2018-02-21 PROCEDURE — 97112 NEUROMUSCULAR REEDUCATION: CPT

## 2018-02-21 NOTE — PROGRESS NOTES
PT DAILY TREATMENT NOTE 3-16    Patient Name: Monica Suh  Date:2018  : 1965  [x]  Patient  Verified  Payor: BLUE CROSS / Plan:  [facility] [place] / Product Type: PPO /    In time:3:34  Out time:4:12  Total Treatment Time (min): 38  Visit #: 8 of 8    Treatment Area: Low back pain [M54.5]    SUBJECTIVE  Pain Level (0-10 scale): 3  Any medication changes, allergies to medications, adverse drug reactions, diagnosis change, or new procedure performed?: [x] No    [] Yes (see summary sheet for update)  Subjective functional status/changes:   [] No changes reported  \"Eh, I was standing today, and my back hurts. \"    OBJECTIVE  38 min Neuromuscular Re-education:  [x]  See flow sheet :   Rationale: increase strength, improve coordination, improve balance and increase proprioception  to improve the patients ability to improve core activation              With   [] TE   [] TA   [] neuro   [] other: Patient Education: [x] Review HEP    [] Progressed/Changed HEP based on:   [] positioning   [] body mechanics   [] transfers   [] heat/ice application    [] other:      Other Objective/Functional Measures: Added seated UE arm movements at reformer and Chair seated pump with light springs to emphasize axial elongation    Pain Level (0-10 scale) post treatment: 0    ASSESSMENT/Changes in Function:   Patient provided with spine specialist information. FOTO score increase indicating functional improvement. Will continue 1x4 weeks. Patient will continue to benefit from skilled PT services to modify and progress therapeutic interventions, address functional mobility deficits, address ROM deficits, address strength deficits, analyze and address soft tissue restrictions, analyze and cue movement patterns, analyze and modify body mechanics/ergonomics and assess and modify postural abnormalities to attain remaining goals.      []  See Plan of Care  [x]  See progress note/recertification  []  See Discharge Summary     Progress towards goals / Updated goals:  1.  Pt will improve FOTO to 68 to increase ease of ADLs. Met, score to 70 (2/21/2018)  2.  Pt will improve standing tolerance to 2 hours to increase ease of ADLs. -not met, patient reports 20 minutes (2/14/2018)  3.  Pt will demonstrate a 3 cm difference in lower thoracic circumference between maximal exhalation and maximal inhalation to alleviate diaphragmatic spasm to activate TA during functional postures.  Not met - remains 2 cm with maximal effort 2/2/2018; Improved to 3 CM at the end of the workout 2/19/2018    PLAN  [x]  Upgrade activities as tolerated     [x]  Continue plan of care  []  Update interventions per flow sheet       []  Discharge due to:_  []  Other:_      Isabella Milan 2/21/2018  3:36 PM    No future appointments.

## 2018-02-23 NOTE — PROGRESS NOTES
[facility]  [address]  [place], [address]  [phone] fax    Physical Therapy Progress Note  Patient name: Laura Browning Start of Care: 2017   Referral source: Celestine Severe, MD : 1965                          Medical Diagnosis: Low back pain [M54.5] Onset Date:5 weeks ago                          Treatment Diagnosis: back pain   Prior Hospitalization: see medical history Provider#: [id number]   Medications: Verified on Patient summary List    Comorbidities: psoriatic arthritis, back pain, HA, sleep dysfunction   Prior Level of Function: Unlimited standing tolerance, no LE symptoms.  Recreational exerciser up unitl 2 months ago - heavy machine based weight circuits maxing out on doable weights for all global muscle groups.      Visits from Start of Care: 16    Missed Visits: 2      Key Functional Changes: FOTO score has increased from 57 to 70 indicating improved ability to perform daily tasks. Pt is just starting to show some progress with compensatory strategies allowing for progression towards functional training. Therapist suggests 4 more weekly sessions to improve functional strength for greater standing tolerance. Progress towards goals  1.  Pt will improve FOTO to 68 to increase ease of ADLs. Met, score to 70 (2018)  2.  Pt will improve standing tolerance to 2 hours to increase ease of ADLs. -not met, patient reports 20 minutes (2018)  3.  Pt will demonstrate a 3 cm difference in lower thoracic circumference between maximal exhalation and maximal inhalation to alleviate diaphragmatic spasm to activate TA during functional postures.  Not met - remains 2 cm with maximal effort 2018; Improved to 3 CM at the end of the workout 2018    Updated Goals: to be achieved in 4 weeks:  1. Pt will increase pain free standing tolerance to 30 minutes to increase ease of ADLs  2.   Pt will be progressed towards a gym based functional treatment exercise program for continued self treatment post discharge. ASSESSMENT/RECOMMENDATIONS:  [x]Continue therapy per initial plan/protocol at a frequency of  1 x per week for 4 weeks  []Continue therapy with the following recommended changes:_____________________      _____________________________________________________________________  []Discontinue therapy progressing towards or have reached established goals  []Discontinue therapy due to lack of appreciable progress towards goals  []Discontinue therapy due to lack of attendance or compliance  []Await Physician's recommendations/decisions regarding therapy  []Other:________________________________________________________________    Thank you for this referral.    Salima Ricks, PT 2/23/2018 9:30 AM  NOTE TO PHYSICIAN:  PLEASE COMPLETE THE ORDERS BELOW AND   FAX TO [facility]: [phone]  If you are unable to process this request in 24 hours please contact our office: [phone]    ? I have read the above report and request that my patient continue as recommended. ? I have read the above report and request that my patient continue therapy with the following changes/special instructions:__________________________________________________________  ? I have read the above report and request that my patient be discharged from therapy.     Physicians signature: ______________________________Date: ______Time:______

## 2018-02-28 ENCOUNTER — HOSPITAL ENCOUNTER (OUTPATIENT)
Dept: PHYSICAL THERAPY | Age: 53
Discharge: HOME | End: 2018-02-28
Payer: COMMERCIAL

## 2018-02-28 PROCEDURE — 97112 NEUROMUSCULAR REEDUCATION: CPT

## 2018-02-28 PROCEDURE — 97110 THERAPEUTIC EXERCISES: CPT

## 2018-02-28 NOTE — PROGRESS NOTES
PT DAILY TREATMENT NOTE 3-16    Patient Name: Mikal Cruz  Date:2018  : 1965  [x]  Patient  Verified  Payor: BLUE CROSS / Plan: [facility] / Product Type: PPO /    In time:4:02  Out time:4:40  Total Treatment Time (min): 38  Visit #: 1 of 4    Treatment Area: Low back pain [M54.5]    SUBJECTIVE  Pain Level (0-10 scale): 4  Any medication changes, allergies to medications, adverse drug reactions, diagnosis change, or new procedure performed?: [x] No    [] Yes (see summary sheet for update)  Subjective functional status/changes:   [] No changes reported  \"I was on my boat all this weekend, and I don't think I did anything major, but I went to [address] the other day, and it just started hurting. \"    \"I made an appointment with the spine specialist.\"    OBJECTIVE  28 min Therapeutic Exercise:  [x] See flow sheet :   Rationale: increase ROM, increase strength and improve coordination to improve the patients ability to increase ease with ADLs    10 min Neuromuscular Re-education:  [x]  See flow sheet :   Rationale: increase ROM, increase strength, improve coordination, improve balance and increase proprioception  to improve the patients ability to improve core activation              With   [] TE   [] TA   [] neuro   [] other: Patient Education: [x] Review HEP    [] Progressed/Changed HEP based on:   [] positioning   [] body mechanics   [] transfers   [] heat/ice application    [] other:      Other Objective/Functional Measures: Added exercises per flowsheet     Pain Level (0-10 scale) post treatment: 2    ASSESSMENT/Changes in Function:   Patient with increase in pain today due to cleaning and performing chores on his boat all weekend. Initiated gym based exercises to progress towards self treatment post discharge.      Patient will continue to benefit from skilled PT services to modify and progress therapeutic interventions, address functional mobility deficits, address ROM deficits, address strength deficits, analyze and address soft tissue restrictions, analyze and cue movement patterns, analyze and modify body mechanics/ergonomics and assess and modify postural abnormalities to attain remaining goals. []  See Plan of Care  []  See progress note/recertification  []  See Discharge Summary         Updated Goals: to be achieved in 4 weeks:  1. Pt will increase pain free standing tolerance to 30 minutes to increase ease of ADLs  2. Pt will be progressed towards a gym based functional treatment exercise program for continued self treatment post discharge.     PLAN  []  Upgrade activities as tolerated     [x]  Continue plan of care  []  Update interventions per flow sheet       []  Discharge due to:_  []  Other:_      Conor Mcrae 2/28/2018  4:02 PM    Future Appointments  Date Time Provider Charlie Louis   3/7/2018 3:30 PM Christina [address] [facility]   3/14/2018 3:00 PM Conor Mcrae [facility]   3/21/2018 1:30  Pradeep Meyers  [address]   3/21/2018 3:00 PM [facility]

## 2018-03-07 ENCOUNTER — HOSPITAL ENCOUNTER (OUTPATIENT)
Dept: PHYSICAL THERAPY | Age: 53
Discharge: HOME | End: 2018-03-07
Payer: COMMERCIAL

## 2018-03-07 PROCEDURE — 97112 NEUROMUSCULAR REEDUCATION: CPT

## 2018-03-07 PROCEDURE — 97110 THERAPEUTIC EXERCISES: CPT

## 2018-03-07 NOTE — PROGRESS NOTES
PT DAILY TREATMENT NOTE 3-16    Patient Name: Reji Enamorado  Date:3/7/2018  : 1965  [x]  Patient  Verified  Payor: BLUE CROSS / Plan: [employer] [facility] [place] / Product Type: PPO /    In time:3:40  Out time:4:12  Total Treatment Time (min): 32  Visit #: 2 of 4    Treatment Area: Low back pain [M54.5]    SUBJECTIVE  Pain Level (0-10 scale): 1  Any medication changes, allergies to medications, adverse drug reactions, diagnosis change, or new procedure performed?: [x] No    [] Yes (see summary sheet for update)  Subjective functional status/changes:   [] No changes reported  \"My appointment with the spine specialist is on . You know, sometimes, it just hurts. \"    OBJECTIVE  22 min Therapeutic Exercise:  [x] See flow sheet :   Rationale: increase ROM, increase strength and improve coordination to improve the patients ability to increase ease with ADLs    10 min Neuromuscular Re-education:  [x]  See flow sheet :   Rationale: increase ROM, increase strength, improve coordination and increase proprioception  to improve the patients ability to improve deep core activation     With   [] TE   [] TA   [] neuro   [] other: Patient Education: [x] Review HEP    [] Progressed/Changed HEP based on:   [] positioning   [] body mechanics   [] transfers   [] heat/ice application    [] other:      Other Objective/Functional Measures:   Held exercises per flowsheet secondary to patient's late arrival     Pain Level (0-10 scale) post treatment: 1    ASSESSMENT/Changes in Function:   He has little carryover of cueing---presents with rib flare with almost all supine reformer work. Encouraged patient to gradually initiate safe, appropriate gym activities to prepare for upcoming D/C in two visits.      Patient will continue to benefit from skilled PT services to modify and progress therapeutic interventions, address functional mobility deficits, address ROM deficits, address strength deficits, analyze and address soft tissue restrictions, analyze and cue movement patterns, analyze and modify body mechanics/ergonomics and assess and modify postural abnormalities to attain remaining goals.      []  See Plan of Care  []  See progress note/recertification  []  See Discharge Summary           Updated Goals: to be achieved in 4 weeks:  1.  Pt will increase pain free standing tolerance to 30 minutes to increase ease of ADLs  2.  Pt will be progressed towards a gym based functional treatment exercise program for continued self treatment post discharge.     PLAN  []  Upgrade activities as tolerated     [x]  Continue plan of care  []  Update interventions per flow sheet       []  Discharge due to:_  []  Other:_      Chelo Mesa 3/7/2018  3:40 PM    Future Appointments  Date Time Provider Charlie Louis   3/14/2018 3:00 PM Chelo GARCIASH[facility]   3/21/2018 1:30 PM Manuel Meyers  [address]   3/21/2018 3:00 PM Chelo GARCIAS [facility]

## 2018-03-14 ENCOUNTER — HOSPITAL ENCOUNTER (OUTPATIENT)
Dept: PHYSICAL THERAPY | Age: 53
Discharge: HOME | End: 2018-03-14
Payer: COMMERCIAL

## 2018-03-14 PROCEDURE — 97112 NEUROMUSCULAR REEDUCATION: CPT

## 2018-03-14 PROCEDURE — 97110 THERAPEUTIC EXERCISES: CPT

## 2018-03-14 NOTE — PROGRESS NOTES
PT DAILY TREATMENT NOTE 3-16    Patient Name: Minna Tran  Date:3/14/2018  : 1965  [x]  Patient  Verified  Payor: BLUE CROSS / Plan: [employer] [facility] [place] / Product Type: PPO /    In time:3:00  Out time:3:33  Total Treatment Time (min): 33  Visit #: 3 of 4    Treatment Area: Low back pain [M54.5]    SUBJECTIVE  Pain Level (0-10 scale): 2  Any medication changes, allergies to medications, adverse drug reactions, diagnosis change, or new procedure performed?: [x] No    [] Yes (see summary sheet for update)  Subjective functional status/changes:   [] No changes reported  \"Back is OK. \"    OBJECTIVE  23 min Therapeutic Exercise:  [x] See flow sheet :   Rationale: increase ROM, increase strength and improve coordination to improve the patients ability to increase ease with ADLs    10 min Neuromuscular Re-education:  [x]  See flow sheet :   Rationale: increase strength, improve coordination, improve balance and increase proprioception  to improve the patients ability to improve core activation with gym-based activities            With   [] TE   [] TA   [] neuro   [] other: Patient Education: [x] Review HEP    [] Progressed/Changed HEP based on:   [] positioning   [] body mechanics   [] transfers   [] heat/ice application    [] other:      Other Objective/Functional Measures:   Emphasis on gym based activities    He is very challenged to maintain neutral spine with quadruped positioning     Pain Level (0-10 scale) post treatment: 0    ASSESSMENT/Changes in Function:   Patient has not yet initiated returning to gym. Will provide patient with gym based HEP next visit and D/C.      Patient will continue to benefit from skilled PT services to modify and progress therapeutic interventions, address functional mobility deficits, address ROM deficits, address strength deficits, analyze and address soft tissue restrictions, analyze and cue movement patterns, analyze and modify body mechanics/ergonomics and assess and modify postural abnormalities to attain remaining goals. []  See Plan of Care  []  See progress note/recertification  []  See Discharge Summary         Updated Goals: to be achieved in 4 weeks:  1.  Pt will increase pain free standing tolerance to 30 minutes to increase ease of ADLs. -met, patient reports 1/2 hour (3/14/2018)  2.  Pt will be progressed towards a gym based functional treatment exercise program for continued self treatment post discharge.     PLAN  []  Upgrade activities as tolerated     [x]  Continue plan of care  []  Update interventions per flow sheet       []  Discharge due to:_  []  Other:_      Sharif Filter 3/14/2018  3:07 PM    Future Appointments  Date Time Provider Charlie Missy   3/21/2018 1:30 PM Elizabeth Swanson  [address]   3/21/2018 3:00 PM Sharif Filter [id number]

## 2018-03-21 ENCOUNTER — HOSPITAL ENCOUNTER (OUTPATIENT)
Dept: PHYSICAL THERAPY | Age: 53
Discharge: HOME | End: 2018-03-21
Payer: COMMERCIAL

## 2018-03-21 ENCOUNTER — OFFICE VISIT (OUTPATIENT)
Dept: ORTHOPEDIC SURGERY | Age: 53
End: 2018-03-21

## 2018-03-21 VITALS
RESPIRATION RATE: 18 BRPM | HEIGHT: 71 IN | DIASTOLIC BLOOD PRESSURE: 101 MMHG | WEIGHT: 224 LBS | SYSTOLIC BLOOD PRESSURE: 136 MMHG | OXYGEN SATURATION: 95 % | HEART RATE: 87 BPM | BODY MASS INDEX: 31.36 KG/M2 | TEMPERATURE: 98.6 F

## 2018-03-21 DIAGNOSIS — M43.16 SPONDYLOLISTHESIS OF LUMBAR REGION: Primary | ICD-10-CM

## 2018-03-21 DIAGNOSIS — M51.36 DDD (DEGENERATIVE DISC DISEASE), LUMBAR: ICD-10-CM

## 2018-03-21 PROCEDURE — 97110 THERAPEUTIC EXERCISES: CPT

## 2018-03-21 PROCEDURE — 97112 NEUROMUSCULAR REEDUCATION: CPT

## 2018-03-21 RX ORDER — IBUPROFEN 200 MG
200 TABLET ORAL
COMMUNITY
End: 2018-06-06

## 2018-03-21 RX ORDER — ATORVASTATIN CALCIUM 40 MG/1
TABLET, FILM COATED ORAL
COMMUNITY
Start: 2018-03-13

## 2018-03-21 RX ORDER — GABAPENTIN 100 MG/1
100 CAPSULE ORAL
Qty: 30 CAP | Refills: 1 | Status: SHIPPED | OUTPATIENT
Start: 2018-03-21 | End: 2018-04-25 | Stop reason: SDUPTHER

## 2018-03-21 RX ORDER — FENOFIBRATE 48 MG/1
TABLET, COATED ORAL
COMMUNITY
Start: 2018-03-13

## 2018-03-21 NOTE — PROGRESS NOTES
[address]  [address], [address]  [place]  Phone: [phone]  Fax: [phone]        Jef Pacheco  : 1965  PCP: Noam Art MD      NEW PATIENT      ASSESSMENT AND PLAN     Diagnoses and all orders for this visit:    1. Spondylolisthesis of lumbar region  -     MRI LUMB SPINE WO CONT; Future  -     gabapentin (NEURONTIN) 100 mg capsule; Take 1 Cap by mouth nightly. 2. DDD (degenerative disc disease), lumbar  -     MRI LUMB SPINE WO CONT; Future    1. Advised to stay active as tolerated. 2. Lumbar spine MRI, 4 months of symptoms unresponsive to modalities/NSAIDS/PT  3. Trial of low dose Gabapentin. 4. Pt does not wish to have work restrictions. 5. Given information on preventing back injury. Follow-up Disposition:  Return for MRI/CT f/u. CHIEF COMPLAINT  Theodora Pradhan is seen today in consultation at the request of Dr. Liliam Resendez for complaints of back pain. HISTORY OF PRESENT ILLNESS  Theodora Pradhan is a 47 y.o. male. Today pt c/o back pain of 35 years duration. 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 Pt had a lifting injury and pulled his back x2 in one day. He states that after he was , he went to [facility] and had another injury when he was surfing. He got better from this injury and would have flares of pain. He started working out again in his 45s and his pain got better. He stopped going to the gym for about a month and went back in 2017 and began to have severe pain. He tried ice, heat, baths, Ibuprofen, without any relief. Pt notes that he went to his PCP after he was unable to stand for any length of time. He is currently in physical therapy. He states that physical therapy has helped him some but he has severe pain on the weekend. He has numbness in his shins, B/L. Also notices that in  he had severe flares of his psoriasis, he started taking Enbrel and had relief. Also notices that his back pain got better after starting Enbrel as well. He is unsure if he has psoriatic arthritis. Pt is still on Enbrel but is still having back pain. He has a walking tolerance of about 20 minutes. His shin numbness/pain is mainly felt with walking. Pt reports pain does not radiate into BLE. Pt denies weakness in BLE. Pt denies saddle paresthesias. Pt states he has used Motrin. Pt has tried; PT-Yes,  Non-opioid medications Yes,  spinal injections No, and spinal surgery No. Denies persistent fevers, chills, weight changes, neurogenic bowel or bladder symptoms. Pt denies recent ED visits or hospitalizations. PMHx of psoriasis, reflux.  reviewed. He works at [employer] as an . Has been seen by Dr. Mika Faulkner 15 years ago, had MRI at that time, did not recommend surgery. Pain Assessment  3/21/2018   Location of Pain Back   Severity of Pain 4   Quality of Pain Aching   Frequency of Pain Constant   Aggravating Factors Standing;Walking   Relieving Factors NSAID           XR Results (most recent):    Results from Hospital Encounter encounter on 12/15/17   XR SPINE LUMB 2 OR 3 V   Narrative EXAM: X-ray of the Lumbar Spine. INDICATION: Back pain    TECHNIQUE: AP, lateral and spot views of the lumbar spine obtained. COMPARISON: None    FINDINGS: There are 5 lumbar-type vertebra. There is mild retrolisthesis of L4  on L5 by 6 mm. No fracture appreciated. The facets are appropriately aligned. Moderate degenerative disc disease at L4-L5 and L5-S1 are noted with endplate  sclerosis and osteophyte formation. Impression IMPRESSION:  1. Moderate degenerative disc disease at L4-L5 and L5-S1.    2.  Mild retrolisthesis of L4 on L5.              PAST MEDICAL HISTORY   Past Medical History:   Diagnosis Date    Closed fracture of metacarpal bone(s), site unspecified     Esophageal reflux     Hyperlipidemia     Psoriasis        Past Surgical History:   Procedure Laterality Date    HX ORTHOPAEDIC      left hand       MEDICATIONS      Current Outpatient Prescriptions   Medication Sig Dispense Refill    atorvastatin (LIPITOR) 40 mg tablet       fenofibrate nanocrystallized (TRICOR) 48 mg tablet       ibuprofen (MOTRIN) 200 mg tablet Take 200 mg by mouth. Takes 4 tabs TID as needed      gabapentin (NEURONTIN) 100 mg capsule Take 1 Cap by mouth nightly. 30 Cap 1       ALLERGIES  No Known Allergies       SOCIAL HISTORY    Social History     Social History    Marital status:      Spouse name: N/A    Number of children: N/A    Years of education: N/A     Occupational History    Not on file. Social History Main Topics    Smoking status: Never Smoker    Smokeless tobacco: Never Used    Alcohol use Not on file    Drug use: Not on file    Sexual activity: Not on file     Other Topics Concern    Not on file     Social History Narrative       FAMILY HISTORY    Family History   Problem Relation Age of Onset    High Cholesterol Other     Diabetes Other     Breast Cancer Other     Cancer Mother      breast    Diabetes Father      type 2    Hypertension Father          REVIEW OF SYSTEMS  Review of Systems   Constitutional: Negative for chills, fever and weight loss. Respiratory: Negative for shortness of breath. Cardiovascular: Negative for chest pain. Gastrointestinal: Negative for constipation. Negative for fecal incontinence   Genitourinary: Negative for dysuria. Negative for urinary incontinence   Musculoskeletal:        Per HPI   Skin: Negative for rash. Neurological: Positive for tingling and sensory change. Negative for dizziness, tremors, focal weakness and headaches. Endo/Heme/Allergies: Does not bruise/bleed easily. Psychiatric/Behavioral: The patient does not have insomnia.           PHYSICAL EXAMINATION  Visit Vitals    BP (!) 136/101    Pulse 87    Temp 98.6 °F (37 °C) (Oral)    Resp 18    Ht 5' 11\" (1.803 m)    Wt 224 lb (101.6 kg)  SpO2 95%    BMI 31.24 kg/m2          Accompanied by self. Constitutional:  Well developed, well nourished, in no acute distress. Psychiatric: Affect and mood are appropriate. Integumentary: No rashes or abrasions noted on exposed areas. Cardiovascular/Peripheral Vascular: Intact l pulses. No peripheral edema is noted. Lymphatic:  No evidence of lymphedema. No cervical lymphadenopathy. SPINE/MUSCULOSKELETAL EXAM        Lumbar spine:  No rash, ecchymosis, or gross obliquity. No fasciculations. No focal atrophy is noted. No tenderness to palpation at the sciatic notch. SI joints non-tender. Trochanters non tender. Facet load intolerance. Sensation grossly intact to light touch. MOTOR:       Hip Flex  Quads Hamstrings Ankle DF EHL Ankle PF   Right +4/5 +4/5 +4/5 +4/5 +4/5 +4/5   Left +4/5 +4/5 +4/5 +4/5 +4/5 +4/5     Straight Leg raise negative. No difficulty with tandem gait. Ambulation without assistive device. FWB. Written by Howie Galindo, as dictated by Claudette Beltran MD.    I, Dr. Claudette Beltran MD, confirm that all documentation is accurate. Mr. Xavier Brown may have a reminder for a \"due or due soon\" health maintenance. I have asked that he contact his primary care provider for follow-up on this health maintenance.

## 2018-03-21 NOTE — PATIENT INSTRUCTIONS
Back Care and Preventing Injuries: Care Instructions  Your Care Instructions    You can hurt your back doing many everyday activities: lifting a heavy box, bending down to garden, exercising at the gym, and even getting out of bed. But you can keep your back strong and healthy by doing some exercises. You also can follow a few tips for sitting, sleeping, and lifting to avoid hurting your back again. Talk to your doctor before you start an exercise program. Ask for help if you want to learn more about keeping your back healthy. Follow-up care is a key part of your treatment and safety. Be sure to make and go to all appointments, and call your doctor if you are having problems. It's also a good idea to know your test results and keep a list of the medicines you take. How can you care for yourself at home? · Stay at a healthy weight to avoid strain on your lower back. · Do not smoke. Smoking increases the risk of osteoporosis, which weakens the spine. If you need help quitting, talk to your doctor about stop-smoking programs and medicines. These can increase your chances of quitting for good. · Make sure you sleep in a position that maintains your back's normal curves and on a mattress that feels comfortable. Sleep on your side with a pillow between your knees, or sleep on your back with a pillow under your knees. These positions can reduce strain on your back. · When you get out of bed, lie on your side and bend both knees. Drop your feet over the edge of the bed as you push up with both arms. Scoot to the edge of the bed. Make sure your feet are in line with your rear end (buttocks), and then stand up. · If you must stand for a long time, put one foot on a stool, ledge, or box. Exercise to strengthen your back and other muscles  · Get at least 30 minutes of exercise on most days of the week. Walking is a good choice.  You also may want to do other activities, such as running, swimming, cycling, or playing tennis or team sports. · Stretch your back muscles. Here are few exercises to try:  Abram Mccormack on your back with your knees bent and your feet flat on the floor. Gently pull one bent knee to your chest. Put that foot back on the floor, and then pull the other knee to your chest. Hold for 15 to 30 seconds. Repeat 2 to 4 times. ¨ Do pelvic tilts. Lie on your back with your knees bent. Tighten your stomach muscles. Pull your belly button (navel) in and up toward your ribs. You should feel like your back is pressing to the floor and your hips and pelvis are slightly lifting off the floor. Hold for 6 seconds while breathing smoothly. · Keep your core muscles strong. The muscles of your back, belly (abdomen), and buttocks support your spine. ¨ Pull in your belly, and imagine pulling your navel toward your spine. Hold this for 6 seconds, then relax. Remember to keep breathing normally as you tense your muscles. ¨ Do curl-ups. Always do them with your knees bent. Keep your low back on the floor, and curl your shoulders toward your knees using a smooth, slow motion. Keep your arms folded across your chest. If this bothers your neck, try putting your hands behind your neck (not your head), with your elbows spread apart. ¨ Lie on your back with your knees bent and your feet flat on the floor. Tighten your belly muscles, and then push with your feet and raise your buttocks up a few inches. Hold this position 6 seconds as you continue to breathe normally, then lower yourself slowly to the floor. Repeat 8 to 12 times. ¨ If you like group exercise, try Pilates or yoga. These classes have poses that strengthen the core muscles. Protect your back when you sit  · Place a small pillow, a rolled-up towel, or a lumbar roll in the curve of your back if you need extra support. · Sit in a chair that is low enough to let you place both feet flat on the floor with both knees nearly level with your hips.  If your chair or desk is too high, use a foot rest to raise your knees. · When driving, keep your knees nearly level with your hips. Sit straight, and drive with both hands on the steering wheel. Your arms should be in a slightly bent position. · Try a kneeling chair, which helps tilt your hips forward. This takes pressure off your lower back. · Try sitting on an exercise ball. It can rock from side to side, which helps keep your back loose. Lift properly  · Squat down, bending at the hips and knees only. If you need to, put one knee to the floor and extend your other knee in front of you, bent at a right angle (half kneeling). · Press your chest straight forward. This helps keep your upper back straight while keeping a slight arch in your low back. · Hold the load as close to your body as possible, at the level of your navel. · Use your feet to change direction, taking small steps. · Lead with your hips as you change direction. Keep your shoulders in line with your hips as you move. Do not twist your body. · Set down your load carefully, squatting with your knees and hips only. When should you call for help? Watch closely for changes in your health, and be sure to contact your doctor if you have any problems. Where can you learn more? Go to http://linda-haley.info/. Enter S810 in the search box to learn more about \"Back Care and Preventing Injuries: Care Instructions. \"  Current as of: March 21, 2017  Content Version: 11.4  © 8451-1451 [employer]. Care instructions adapted under license by [employer] (which disclaims liability or warranty for this information). If you have questions about a medical condition or this instruction, always ask your healthcare professional. [address] any warranty or liability for your use of this information. Back Stretches: Exercises  Your Care Instructions  Here are some examples of exercises for stretching your back. Start each exercise slowly. Ease off the exercise if you start to have pain. Your doctor or physical therapist will tell you when you can start these exercises and which ones will work best for you. How to do the exercises  Overhead stretch    1. Stand comfortably with your feet shoulder-width apart. 2. Looking straight ahead, raise both arms over your head and reach toward the ceiling. Do not allow your head to tilt back. 3. Hold for 15 to 30 seconds, then lower your arms to your sides. 4. Repeat 2 to 4 times. Side stretch    1. Stand comfortably with your feet shoulder-width apart. 2. Raise one arm over your head, and then lean to the other side. 3. Slide your hand down your leg as you let the weight of your arm gently stretch your side muscles. Hold for 15 to 30 seconds. 4. Repeat 2 to 4 times on each side. Press-up    1. Lie on your stomach, supporting your body with your forearms. 2. Press your elbows down into the floor to raise your upper back. As you do this, relax your stomach muscles and allow your back to arch without using your back muscles. As your press up, do not let your hips or pelvis come off the floor. 3. Hold for 15 to 30 seconds, then relax. 4. Repeat 2 to 4 times. Relax and rest    1. Lie on your back with a rolled towel under your neck and a pillow under your knees. Extend your arms comfortably to your sides. 2. Relax and breathe normally. 3. Remain in this position for about 10 minutes. 4. If you can, do this 2 or 3 times each day. Follow-up care is a key part of your treatment and safety. Be sure to make and go to all appointments, and call your doctor if you are having problems. It's also a good idea to know your test results and keep a list of the medicines you take. Where can you learn more? Go to http://linda-haley.info/. Enter Y126 in the search box to learn more about \"Back Stretches: Exercises. \"  Current as of: March 21, 2017  Content Version: 11.4  © 2006-2017 [employer]. Care instructions adapted under license by [employer] (which disclaims liability or warranty for this information). If you have questions about a medical condition or this instruction, always ask your healthcare professional. Estrella Newman any warranty or liability for your use of this information. Learning About Degenerative Disc Disease  What is degenerative disc disease? Degenerative disc disease is not really a disease. It's a term used to describe the normal changes in your spinal discs as you age. Spinal discs are small, spongy discs that separate the bones (vertebrae) that make up the spine. The discs act as shock absorbers for the spine, so it can flex, bend, and twist.  Degenerative disc disease can take place in one or more places along the spine. It most often occurs in the discs in the lower back and the neck. What causes it? As we age, our spinal discs break down, or degenerate. This breakdown causes the symptoms of degenerative disc disease in some people. When the discs break down, they can lose fluid and dry out, and their outer layers can have tiny cracks or tears. This leads to less padding and less space between the bones in the spine. The body reacts to this by making bony growths on the spine called bone spurs. These spurs can press on the spinal nerve roots or spinal cord. This can cause pain and can affect how well the nerves work. What are the symptoms? Many people with degenerative disc disease have no pain. But others have severe pain or other symptoms that limit their activities. Some of the most common symptoms are:  · Pain in the back or neck. Where the pain occurs depends on which discs are affected. · Pain that gets worse when you move, such as bending over, reaching up, or twisting. · Pain that may occur in the rear end (buttocks), arm, or leg if a nerve is pinched.   · Numbness or tingling in your arm or leg. How is it diagnosed? A doctor can often diagnose degenerative disc disease while doing a physical exam. If your exam shows no signs of a serious condition, imaging tests (such as an X-ray) aren't likely to help your doctor find the cause of your symptoms. Sometimes degenerative disc disease is found when an X-ray is taken for another reason, such as an injury or other health problem. But even if the doctor finds degenerative disc disease, that doesn't always mean that you will have symptoms. How is it treated? There are several things you can do at home to manage pain from this problem. · To relieve pain, use ice or heat (whichever feels better) on the affected area. ¨ Put ice or a cold pack on the area for 10 to 20 minutes at a time. Put a thin cloth between the ice and your skin. ¨ Put a warm water bottle, a heating pad set on low, or a warm cloth on your back. Put a thin cloth between the heating pad and your skin. Do not go to sleep with a heating pad on your skin. · Ask your doctor if you can take acetaminophen (such as Tylenol) or nonsteroidal anti-inflammatory drugs, such as ibuprofen or naproxen. Your doctor can prescribe stronger medicines if needed. Be safe with medicines. Read and follow all instructions on the label. · Get some exercise every day. Exercise is one of the best ways to help your back feel better and stay better. It's best to start each exercise slowly. You may notice a little soreness, and that's okay. But if an exercise makes your pain worse, stop doing it. Here are things you can try:  ¨ Walking. It's the simplest and maybe the best activity for your back. It gets your blood moving and helps your muscles stay strong. ¨ Exercises that gently stretch and strengthen your stomach, back, and leg muscles. The stronger those muscles are, the better they're able to protect your back.   If you have constant or severe pain in your back or spine, you may need other treatments, such as physical therapy. In some cases, your doctor may suggest surgery. Follow-up care is a key part of your treatment and safety. Be sure to make and go to all appointments, and call your doctor if you are having problems. It's also a good idea to know your test results and keep a list of the medicines you take. Where can you learn more? Go to http://linda-haley.info/. Enter Y264 in the search box to learn more about \"Learning About Degenerative Disc Disease. \"  Current as of: March 21, 2017  Content Version: 11.4  © 5197-9063 [employer]. Care instructions adapted under license by [employer] (which disclaims liability or warranty for this information). If you have questions about a medical condition or this instruction, always ask your healthcare professional. [address] any warranty or liability for your use of this information.

## 2018-03-21 NOTE — PROGRESS NOTES
PT DAILY TREATMENT NOTE 3-16    Patient Name: Monica Suh  Date:3/21/2018  : 1965  [x]  Patient  Verified  Payor: BLUE CROSS / Plan: "[employer] [facility] / Product Type: PPO /    In time:3:02  Out time:3:36  Total Treatment Time (min): 34  Visit #: 4 of 4    Treatment Area: Low back pain [M54.5]    SUBJECTIVE  Pain Level (0-10 scale): 3  Any medication changes, allergies to medications, adverse drug reactions, diagnosis change, or new procedure performed?: [x] No    [] Yes (see summary sheet for update)  Subjective functional status/changes:   [] No changes reported  Patient has just come from spine specialist appointment. He was given Gabapentin and is scheduled for an MRI. OBJECTIVE  24 min Therapeutic Exercise:  [x] See flow sheet :   Rationale: increase ROM, increase strength and improve coordination to improve the patients ability to increase ease with ADLs    10 min Neuromuscular Re-education:  [x]  See flow sheet :   Rationale: increase strength, improve coordination and increase proprioception  to improve the patients ability to improve core activation with gym-based activities           With   [] TE   [] TA   [] neuro   [] other: Patient Education: [x] Review HEP    [] Progressed/Changed HEP based on:   [] positioning   [] body mechanics   [] transfers   [] heat/ice application    [] other:      Other Objective/Functional Measures: FOTO: 76     Pain Level (0-10 scale) post treatment: 1-2    ASSESSMENT/Changes in Function:   Patient is D/C today with updated HEP with emphasis on gym-based activities. He went to the spine specialist and was prescribed Gabapentin and is scheduled for MRI.      Patient will continue to benefit from skilled PT services to modify and progress therapeutic interventions, address functional mobility deficits, address ROM deficits, address strength deficits, analyze and address soft tissue restrictions, analyze and cue movement patterns, analyze and modify body mechanics/ergonomics and assess and modify postural abnormalities to attain remaining goals. []  See Plan of Care  []  See progress note/recertification  [x]  See Discharge Summary         Updated Goals: to be achieved in 4 weeks:  1.  Pt will increase pain free standing tolerance to 30 minutes to increase ease of ADLs. -met, patient reports 1/2 hour (3/14/2018)  2.  Pt will be progressed towards a gym based functional treatment exercise program for continued self treatment post discharge. met (3/21/2018)    PLAN  []  Upgrade activities as tolerated     []  Continue plan of care  []  Update interventions per flow sheet       [x]  Discharge   []  Other:_      Mary Bentíez 3/21/2018  2:54 PM    Future Appointments  Date Time Provider Charlie Louis   3/21/2018 3:00 PM Mary Benítez [id number]

## 2018-03-21 NOTE — PROGRESS NOTES
Verbal order entered per Dr. Lukas Paige as documented on blue sheet:MRI L-spine 3 month increased LBP, limited walked tolerance, failed NSAIDs. Gabapentin 100mg take 1 tab po QHS.  Disp 30 with 1 refill

## 2018-03-23 ENCOUNTER — HOSPITAL ENCOUNTER (OUTPATIENT)
Age: 53
Discharge: HOME | End: 2018-03-23
Attending: PHYSICAL MEDICINE & REHABILITATION
Payer: COMMERCIAL

## 2018-03-23 DIAGNOSIS — M43.16 SPONDYLOLISTHESIS OF LUMBAR REGION: ICD-10-CM

## 2018-03-23 DIAGNOSIS — M51.36 DDD (DEGENERATIVE DISC DISEASE), LUMBAR: ICD-10-CM

## 2018-03-23 PROCEDURE — 72148 MRI LUMBAR SPINE W/O DYE: CPT

## 2018-03-26 ENCOUNTER — DOCUMENTATION ONLY (OUTPATIENT)
Dept: ORTHOPEDIC SURGERY | Age: 53
End: 2018-03-26

## 2018-03-26 DIAGNOSIS — N28.1 CYST OF RIGHT KIDNEY: Primary | ICD-10-CM

## 2018-03-26 NOTE — PROGRESS NOTES
Verbal order entered per Dr. Godfrey Lockhart as documented in message: Renal U/S due to simple cyst right kidney seen on Lumbar MRI.

## 2018-03-26 NOTE — PROGRESS NOTES
Spoke with patient, two patient identifiers verified. Patient confirmed that he has not had U/S of kidneys, and that he would like to [facility] location for the U/S. Informed patient that order will be given to referral coordinator Jerardo Land, and she would contact him for scheduling. Patient stated understanding.

## 2018-03-26 NOTE — PROGRESS NOTES
Call patient. Has he had an ultrasound of his kidneys ? If not, then schedule for renal U/S for incidental cyst seen on lumbar MRI.

## 2018-03-27 ENCOUNTER — HOSPITAL ENCOUNTER (OUTPATIENT)
Dept: ULTRASOUND IMAGING | Age: 53
Discharge: HOME | End: 2018-03-27
Attending: PHYSICAL MEDICINE & REHABILITATION
Payer: COMMERCIAL

## 2018-03-27 DIAGNOSIS — N28.1 CYST OF RIGHT KIDNEY: ICD-10-CM

## 2018-03-27 PROCEDURE — 76770 US EXAM ABDO BACK WALL COMP: CPT

## 2018-03-28 ENCOUNTER — DOCUMENTATION ONLY (OUTPATIENT)
Dept: ORTHOPEDIC SURGERY | Age: 53
End: 2018-03-28

## 2018-03-30 NOTE — PROGRESS NOTES
Spoke with patient, two patient identifiers verified. Informed patient of Dr. Sarah Berkowitz message below. Patient stated understanding.

## 2018-04-25 ENCOUNTER — OFFICE VISIT (OUTPATIENT)
Dept: ORTHOPEDIC SURGERY | Age: 53
End: 2018-04-25

## 2018-04-25 VITALS
DIASTOLIC BLOOD PRESSURE: 89 MMHG | WEIGHT: 220 LBS | SYSTOLIC BLOOD PRESSURE: 138 MMHG | HEART RATE: 91 BPM | OXYGEN SATURATION: 100 % | HEIGHT: 71 IN | BODY MASS INDEX: 30.8 KG/M2 | TEMPERATURE: 97.4 F | RESPIRATION RATE: 19 BRPM

## 2018-04-25 DIAGNOSIS — M47.816 LUMBAR FACET ARTHROPATHY: ICD-10-CM

## 2018-04-25 DIAGNOSIS — M54.9 MID BACK PAIN: ICD-10-CM

## 2018-04-25 DIAGNOSIS — R20.9 SKIN SENSATION DISTURBANCE: ICD-10-CM

## 2018-04-25 DIAGNOSIS — M43.16 SPONDYLOLISTHESIS OF LUMBAR REGION: ICD-10-CM

## 2018-04-25 DIAGNOSIS — M51.26 PROTRUSION OF LUMBAR INTERVERTEBRAL DISC: Primary | ICD-10-CM

## 2018-04-25 RX ORDER — NAPROXEN 500 MG/1
TABLET ORAL
Qty: 60 TAB | Refills: 1 | Status: SHIPPED | OUTPATIENT
Start: 2018-04-25 | End: 2018-06-06 | Stop reason: SDUPTHER

## 2018-04-25 RX ORDER — GABAPENTIN 300 MG/1
300 CAPSULE ORAL
Qty: 30 CAP | Refills: 2 | Status: SHIPPED | OUTPATIENT
Start: 2018-04-25 | End: 2018-09-05 | Stop reason: SDUPTHER

## 2018-04-25 NOTE — PROGRESS NOTES
[address]  [address], [address]  [place]  Phone: [phone]  Fax: [phone]        Khadijah Rosas  : 1965  PCP: Ubaldo Schirmer, MD    PROGRESS NOTE      ASSESSMENT AND PLAN    Diagnoses and all orders for this visit:    1. Protrusion of lumbar intervertebral disc    2. Lumbar facet arthropathy (HCC)  -     naproxen (NAPROSYN) 500 mg tablet; Take 1 tab po BID with food    3. Spondylolisthesis of lumbar region  -     gabapentin (NEURONTIN) 300 mg capsule; Take 1 Cap by mouth nightly. 4. Mid back pain  -     [65458] T Spine 2V    5. Skin sensation disturbance  -     REFERRAL TO NEUROLOGY    1. Advised to continue HEP. Recommend aqua aerobics. 2. Increase Gabapentin to 300 mg QHS. 3. DC Motrin as clinically ineffective. 4. Trial of Naprosyn. 5. Referral to neurology for shin numbness. 6. Given information on MBB and upper back exercises. Follow-up Disposition:  Return in about 6 weeks (around 2018). HISTORY OF PRESENT ILLNESS  Esau Daniels is a 47 y.o. male. Pt presents to the office for a f/u visit for back pain. Last visit pt was sent to have a lumbar spine MRI. Images reviewed with the pt. Disc bulge L4/5, more on L. Pt was given a trial of Gabapentin last OV. He states that he was initially doing well with Gabapentin but then he did some work on his boat on  and had a flare of pain. His pain has been so severe that he has missed work Monday and Tuesday. His pain is mainly felt in the L side of his low back. He feels as if someone has kicked him between his shoulder blades. He believes this is a muscular issue. This pain is not bothered with coughing, but mainly with bending and lifting. His shoulder blades bother him constantly during the day. Pt reports pain does not radiate into BLE. Pt denies weakness in BLE.      He has numbness in his B/L shins which has been felt since 2017, is intermittent. This is usually felt in his anterior shins, feels that he has weights on his shins. Bending aggravates his pain. He states that his pain prevents him from doing any of his physical therapy exercises. No headaches, blurred vision, or UE symptoms. Asked about EtOH use, admits to using to help pain. Pt denies saddle paresthesias. Pt states he has been using Gabapentin 100 mg QHS with initial relief. Was helping him sleep. Pt states that he is on Accutane as well. Denies persistent fevers, chills, weight changes, neurogenic bowel or bladder symptoms. Pt denies recent ED visits or hospitalizations.  reviewed. Pt has tried; PT-Yes,  Non-opioid medications Yes,  spinal injections No, and spinal surgery No. He works at [employer] as an . PMHx of psoriasis, reflux. Pain Assessment  4/25/2018   Location of Pain Back; Other (comment); Leg   Pain Location Comment numbne in hins both sides   Severity of Pain 10   Quality of Pain Locking   Duration of Pain Persistent   Frequency of Pain Constant   Aggravating Factors Stairs; Walking;Standing;Squatting;Kneeling;Exercise;Straightening;Stretching;Bending   Relieving Factors Nothing   Result of Injury No           MRI Results (most recent):    Results from Hospital Encounter encounter on 03/23/18   MRI LUMB SPINE WO CONT   Narrative EXAM:  MRI LUMB SPINE WO CONT    INDICATION:   3 month increased LBP, limited walking tolerance, failed  NSAIDs/PT, spondylolisthesis of lumbar region and degenerative disc disease  lumbar    COMPARISON: Radiograph 12/15/2017    TECHNIQUE:   MR imaging of the lumbar spine was performed with sagittal T1, T2, STIR;  axial  T1, T2. Contrast was not administered. FINDINGS:  Straightening of the lumbar lordosis. Trace retrolisthesis L4/L5 and L5/S1. Vertebral body heights are normal. No acute or chronic fracture. Disc  desiccation and mild disc height loss L4/5 and L5/S1. No suspicious bone marrow  lesion.  1 cm osseous hemangioma L4 vertebral body and L1 vertebral body. The  conus medullaris terminates at L1 with normal signal.    Correlation of sagittal and axial images at the level of the discs demonstrates  the following:    T12/L1: Partially imaged but the disc appears unremarkable with patent canal and  foramina. L1/2:  Unremarkable disc. No central canal or foraminal stenosis. L2/3:  Unremarkable disc. No central canal or foraminal stenosis. L3/4:  Unremarkable disc. No central canal or foraminal stenosis. L4/5:  Mild disc height loss. Retrolisthesis. Circumferential disc bulge. Mild  facet and ligamentum hypertrophy. Mild central canal stenosis. Moderate  foraminal stenosis. Posterior displacement of the extraforaminal nerve roots by  lateral disc protrusion on the left and endplate osteophyte on the right (series  5 image 17, series 6 image 13 and series 12 images 3-4 and 12-13). L5/S1:  Mild disc height loss. Disc bulge with osteophytic ridging stranding in  the foramina. Mild facet hypertrophy. No central canal stenosis. Moderate  foraminal stenosis. Incidentally noted 4 cm T2 hyperintense cystic mass in the lower pole right  kidney         Impression IMPRESSION:      1. Mild degenerative changes L4/L5 and L5/S1 with retrolisthesis and mild  central stenosis L4/L5.  -Moderate foraminal stenosis at these levels. -Mass effect and posterior displacement of the extraforaminal L4 nerve roots at  L4/5 level by lateral disc protrusion on the left and endplate osteophyte on the  right. 2. Incompletely evaluated 4 cm cystic mass in the right kidney, most likely  simple cyst and can be evaluated with ultrasound.                 US Results (most recent):    Results from [place] encounter on 03/27/18   US RETROPERITONEUM COMP   Narrative ULTRASOUND RETROPERITONEUM    INDICATION: Evaluation of incidental cyst seen on MRI    COMPARISON: MRI lumbar spine 3/23/2018    TECHNIQUE: Static images from sonographic evaluation of the kidneys and bladder  are submitted for review. FINDINGS:    The right kidney is normal in echogenicity, measures 12.7 x 7.1 x 5.6 cm. No  solid mass or hydronephrosis. Lower pole right renal simple cyst measures 4.3 x  4.2 x 4.1 cm. Tiny linear echogenic foci along the right renal sinus probably  represents artifact from nondistended collecting system. The left kidney is normal in echogenicity, measures 12.0 x 5.9 x 5.2 cm. No  solid renal mass or hydronephrosis. Tiny linear echogenic foci along the left  renal sinus probably represents artifact from nondistended collecting system. Post void urinary bladder volume measures 68 mL. The urinary bladder is  otherwise unremarkable. The prostate gland measures 2.8 x 2.4 x 2.7 cm. The spleen is normal in echogenicity and size, measuring up to 9.8 cm in  greatest dimension. Impression IMPRESSION:    1. Simple cyst in the lower right kidney. 2. Tiny linear echogenic foci at bilateral renal sinus probably represents  artifact from nondistended collecting system. Tiny nonobstructing  nephrolithiasis not entirely excluded, but considered less likely. 3. Post void residual bladder volume measured 68 mL           PAST MEDICAL HISTORY   Past Medical History:   Diagnosis Date    Closed fracture of metacarpal bone(s), site unspecified     Esophageal reflux     Hyperlipidemia     Psoriasis        Past Surgical History:   Procedure Laterality Date    HX ORTHOPAEDIC      left hand   . MEDICATIONS      Current Outpatient Prescriptions   Medication Sig Dispense Refill    gabapentin (NEURONTIN) 300 mg capsule Take 1 Cap by mouth nightly. 30 Cap 2    naproxen (NAPROSYN) 500 mg tablet Take 1 tab po BID with food 60 Tab 1    atorvastatin (LIPITOR) 40 mg tablet       fenofibrate nanocrystallized (TRICOR) 48 mg tablet       ibuprofen (MOTRIN) 200 mg tablet Take 200 mg by mouth.  Takes 4 tabs TID as needed          ALLERGIES  No Known Allergies       SOCIAL HISTORY    Social History     Social History    Marital status:      Spouse name: N/A    Number of children: N/A    Years of education: N/A     Occupational History    Not on file. Social History Main Topics    Smoking status: Never Smoker    Smokeless tobacco: Never Used    Alcohol use Not on file    Drug use: Not on file    Sexual activity: Not on file     Other Topics Concern    Not on file     Social History Narrative       FAMILY HISTORY    Family History   Problem Relation Age of Onset    High Cholesterol Other     Diabetes Other     Breast Cancer Other     Cancer Mother      breast    Diabetes Father      type 2    Hypertension Father        REVIEW OF SYSTEMS  Review of Systems   Constitutional: Negative for chills, fever and weight loss. Respiratory: Negative for shortness of breath. Cardiovascular: Negative for chest pain. Gastrointestinal: Negative for constipation. Negative for fecal incontinence   Genitourinary: Negative for dysuria. Negative for urinary incontinence   Musculoskeletal:        Per HPI   Skin: Negative for rash. Neurological: Positive for sensory change. Negative for dizziness, tingling, tremors, focal weakness and headaches. Endo/Heme/Allergies: Does not bruise/bleed easily. Psychiatric/Behavioral: The patient does not have insomnia. PHYSICAL EXAMINATION  Visit Vitals    /89    Pulse 91    Temp 97.4 °F (36.3 °C) (Oral)    Resp 19    Ht 5' 11\" (1.803 m)    Wt 220 lb (99.8 kg)    SpO2 100%    BMI 30.68 kg/m2         Accompanied by self. Constitutional:  Well developed, well nourished, in no acute distress. Psychiatric: Affect and mood are appropriate. Integumentary: No rashes or abrasions noted on exposed areas. Cardiovascular/Peripheral Vascular: Intact l pulses. No peripheral edema is noted. Lymphatic:  No evidence of lymphedema. No cervical lymphadenopathy. SPINE/MUSCULOSKELETAL EXAM    Thoracic spine:  No tenderness to percussion. Lumbar spine:  No rash, ecchymosis, or gross obliquity. No fasciculations. No focal atrophy is noted. Limited ROM. Tenderness to palpation none. No tenderness to palpation at the sciatic notch. SI joints non-tender. Trochanters non tender. Sensation grossly intact to light touch. MOTOR:       Hip Flex  Quads Hamstrings Ankle DF EHL Ankle PF   Right +4/5 +4/5 +4/5 +4/5 +4/5 +4/5   Left +4/5 +4/5 +4/5 +4/5 +4/5 +4/5       Straight Leg raise negative. Ambulation without assistive device. FWB. RADIOGRAPHS  Thoracic spine xray films reviewed:  1) no compression. 2) Anterior degeneration in upper thoracic discs. Written by Edd Scales, as dictated by Siri Suarez MD.    I, Dr. Siri Suarez MD, confirm that all documentation is accurate. Mr. Nita Leong may have a reminder for a \"due or due soon\" health maintenance. I have asked that he contact his primary care provider for follow-up on this health maintenance.

## 2018-04-25 NOTE — MR AVS SNAPSHOT
[address] [place] [address] [facility] [address] [place] [address] 
[phone] Patient: Kermit Hickey MRN: [id number] :1965 Visit Information Date & Time Provider Department Dept. Phone Encounter #  
 2018  9:00 AM Manuel Meyers MD [facility] [phone] [id number] Follow-up Instructions Return in about 6 weeks (around 2018). Upcoming Health Maintenance Date Due DTaP/Tdap/Td series (1 - Tdap) 1987 FOBT Q 1 YEAR AGE 50-75 2016 Influenza Age 5 to Adult 2017 Allergies as of 2018  Review Complete On: 2018 By: Mackenzie Mcconnell No Known Allergies Current Immunizations  Never Reviewed No immunizations on file. Not reviewed this visit You Were Diagnosed With   
  
 Codes Comments Protrusion of lumbar intervertebral disc    -  Primary ICD-10-CM: M51.26 
ICD-9-CM: 722.10 Lumbar facet arthropathy (HCC)     ICD-10-CM: M46.96 
ICD-9-CM: 721.3 Spondylolisthesis of lumbar region     ICD-10-CM: M43.16 
ICD-9-CM: 738.4 Mid back pain     ICD-10-CM: M54.9 ICD-9-CM: 724.5 Skin sensation disturbance     ICD-10-CM: R20.9 ICD-9-CM: 123. 0 Vitals BP Pulse Temp Resp Height(growth percentile) Weight(growth percentile) 138/89 91 97.4 °F (36.3 °C) (Oral) 19 5' 11\" (1.803 m) 220 lb (99.8 kg) SpO2 BMI Smoking Status 100% 30.68 kg/m2 Never Smoker BMI and BSA Data Body Mass Index Body Surface Area  
 30.68 kg/m 2 2.24 m 2 Preferred Pharmacy Pharmacy Name Phone [facility] [address] [facility] Your Updated Medication List  
  
   
This list is accurate as of 18 10:36 AM.  Always use your most recent med list.  
  
  
  
  
 atorvastatin 40 mg tablet Commonly known as:  LIPITOR  
  
 fenofibrate nanocrystallized 48 mg tablet Commonly known as:  TRICOR  
  
 gabapentin 300 mg capsule Commonly known as:  NEURONTIN Take 1 Cap by mouth nightly. ibuprofen 200 mg tablet Commonly known as:  MOTRIN Take 200 mg by mouth. Takes 4 tabs TID as needed  
  
 naproxen 500 mg tablet Commonly known as:  NAPROSYN Take 1 tab po BID with food Prescriptions Sent to Pharmacy Refills  
 gabapentin (NEURONTIN) 300 mg capsule 2 Sig: Take 1 Cap by mouth nightly. Class: Normal  
 Pharmacy: [facility] Jemal Dobbs Ph #: [phone] Route: Oral  
 naproxen (NAPROSYN) 500 mg tablet 1 Sig: Take 1 tab po BID with food Class: Normal  
 Pharmacy: [place] [id number] Will Ph #: [phone] We Performed the Following AMB POC XRAY, SPINE; THORACIC, 2 VIEW [64563 CPT(R)] REFERRAL TO NEUROLOGY [BWP34 Custom] Comments:  
 5 months of shin numbness Follow-up Instructions Return in about 6 weeks (around 6/6/2018). Referral Information Referral ID Referred By Referred To  
  
 [id number] Katelynn Mosquera Not Available Visits Status Start Date End Date 1 New Request 4/25/18 4/25/19 If your referral has a status of pending review or denied, additional information will be sent to support the outcome of this decision. Patient Instructions Back Care and Preventing Injuries: Care Instructions Your Care Instructions You can hurt your back doing many everyday activities: lifting a heavy box, bending down to garden, exercising at the gym, and even getting out of bed. But you can keep your back strong and healthy by doing some exercises. You also can follow a few tips for sitting, sleeping, and lifting to avoid hurting your back again. Talk to your doctor before you start an exercise program. Ask for help if you want to learn more about keeping your back healthy. Follow-up care is a key part of your treatment and safety. Be sure to make and go to all appointments, and call your doctor if you are having problems. It's also a good idea to know your test results and keep a list of the medicines you take. How can you care for yourself at home? · Stay at a healthy weight to avoid strain on your lower back. · Do not smoke. Smoking increases the risk of osteoporosis, which weakens the spine. If you need help quitting, talk to your doctor about stop-smoking programs and medicines. These can increase your chances of quitting for good. · Make sure you sleep in a position that maintains your back's normal curves and on a mattress that feels comfortable. Sleep on your side with a pillow between your knees, or sleep on your back with a pillow under your knees. These positions can reduce strain on your back. · When you get out of bed, lie on your side and bend both knees. Drop your feet over the edge of the bed as you push up with both arms. Scoot to the edge of the bed. Make sure your feet are in line with your rear end (buttocks), and then stand up. · If you must stand for a long time, put one foot on a stool, ledge, or box. Exercise to strengthen your back and other muscles · Get at least 30 minutes of exercise on most days of the week. Walking is a good choice. You also may want to do other activities, such as running, swimming, cycling, or playing tennis or team sports. · Stretch your back muscles. Here are few exercises to try: ¨ Lie on your back with your knees bent and your feet flat on the floor. Gently pull one bent knee to your chest. Put that foot back on the floor, and then pull the other knee to your chest. Hold for 15 to 30 seconds. Repeat 2 to 4 times. ¨ Do pelvic tilts. Lie on your back with your knees bent. Tighten your stomach muscles. Pull your belly button (navel) in and up toward your ribs.  You should feel like your back is pressing to the floor and your hips and pelvis are slightly lifting off the floor. Hold for 6 seconds while breathing smoothly. · Keep your core muscles strong. The muscles of your back, belly (abdomen), and buttocks support your spine. ¨ Pull in your belly, and imagine pulling your navel toward your spine. Hold this for 6 seconds, then relax. Remember to keep breathing normally as you tense your muscles. ¨ Do curl-ups. Always do them with your knees bent. Keep your low back on the floor, and curl your shoulders toward your knees using a smooth, slow motion. Keep your arms folded across your chest. If this bothers your neck, try putting your hands behind your neck (not your head), with your elbows spread apart. ¨ Lie on your back with your knees bent and your feet flat on the floor. Tighten your belly muscles, and then push with your feet and raise your buttocks up a few inches. Hold this position 6 seconds as you continue to breathe normally, then lower yourself slowly to the floor. Repeat 8 to 12 times. ¨ If you like group exercise, try Pilates or yoga. These classes have poses that strengthen the core muscles. Protect your back when you sit · Place a small pillow, a rolled-up towel, or a lumbar roll in the curve of your back if you need extra support. · Sit in a chair that is low enough to let you place both feet flat on the floor with both knees nearly level with your hips. If your chair or desk is too high, use a foot rest to raise your knees. · When driving, keep your knees nearly level with your hips. Sit straight, and drive with both hands on the steering wheel. Your arms should be in a slightly bent position. · Try a kneeling chair, which helps tilt your hips forward. This takes pressure off your lower back. · Try sitting on an exercise ball. It can rock from side to side, which helps keep your back loose. Lift properly · Squat down, bending at the hips and knees only.  If you need to, put one knee to the floor and extend your other knee in front of you, bent at a right angle (half kneeling). · Press your chest straight forward. This helps keep your upper back straight while keeping a slight arch in your low back. · Hold the load as close to your body as possible, at the level of your navel. · Use your feet to change direction, taking small steps. · Lead with your hips as you change direction. Keep your shoulders in line with your hips as you move. Do not twist your body. · Set down your load carefully, squatting with your knees and hips only. When should you call for help? Watch closely for changes in your health, and be sure to contact your doctor if you have any problems. Where can you learn more? Go to http://linda-haley.info/. Enter S810 in the search box to learn more about \"Back Care and Preventing Injuries: Care Instructions. \" Current as of: March 21, 2017 Content Version: 11.4 © 5313-1420 [employer]. Care instructions adapted under license by [employer] (which disclaims liability or warranty for this information). If you have questions about a medical condition or this instruction, always ask your healthcare professional. [address] any warranty or liability for your use of this information. Learning About Medial Branch Block and Neurotomy What are medial branch block and neurotomy? Facet joints connect your vertebrae to each other. Problems in these joints can cause chronic (long-term) pain in the neck or back. They can sometimes affect the shoulders, arms, buttocks, or legs. Medial branch nerves are the nerves that carry many of the pain messages from your facet joints. Radiofrequency medial branch neurotomy is a type of medial branch neurotomy that is used to relieve arthritis pain. It uses radio waves to damage nerves in your neck or back so that they can no longer send pain messages to your brain. Before your doctor knows if a neurotomy will help you, he or she will do a medial branch block to find out if certain nerves are the ones that are a source of your pain. You will need two separate visits to the outpatient center or hospital to have both procedures. How is a medial branch block done? The doctor will use a tiny needle to numb the skin where you will get the block. Then he or she puts the block needle into the numbed area. You may feel some pressure, but you should not feel pain. Using fluoroscopy (live X-ray) to guide the needle, the doctor injects medicine onto one or more nerves to make them numb. If you get relief from your pain in the next 4 to 6 hours, it's a sign that those nerves may be contributing to your pain. The relief will last only a short time. You may then have a medial branch neurotomy at a later visit to try to get longer relief. It takes 20 to 30 minutes to get the block. You can go home after the doctor watches you for about an hour. You will get instructions on how to report how much pain you have when you are at home. You will need someone to drive you home. How is medial branch neurotomy done? The doctor will use a tiny needle to numb the skin where you will get the neurotomy. Then he or she puts the neurotomy needle into the numbed area. You may feel some pressure. Using fluoroscopy (live X-ray) to guide the needle, the doctor sends radio waves through the needle to the nerve for 60 to 90 seconds. The radio waves heat the nerve, which damages it. The doctor may do this several times. And he or she may treat more than one nerve. It takes 45 to 90 minutes to get a neurotomy, depending on how many nerves are heated. You will probably go home 30 to 60 minutes later. You will need someone to drive you home. What can you expect after a neurotomy?  
You may feel a little sore or tender at the injection site at first. But after a successful neurotomy, most people have pain relief right away. It often lasts for 9 to 12 months or longer. Sometimes the pain relief is permanent. If your pain does come back, it may mean that the damaged nerve has healed and can send pain messages again. Or it can mean that a different nerve is causing pain. Your doctor will discuss your options with you. Follow-up care is a key part of your treatment and safety. Be sure to make and go to all appointments, and call your doctor if you are having problems. It's also a good idea to know your test results and keep a list of the medicines you take. Where can you learn more? Go to http://linda-haley.info/. Enter V110 in the search box to learn more about \"Learning About Medial Branch Block and Neurotomy. \" Current as of: October 14, 2016 Content Version: 11.4 © 1256-0374 [employer]. Care instructions adapted under license by [employer] (which disclaims liability or warranty for this information). If you have questions about a medical condition or this instruction, always ask your healthcare professional. [address] any warranty or liability for your use of this information. Healthy Upper Back: Exercises Your Care Instructions Here are some examples of exercises for your upper back. Start each exercise slowly. Ease off the exercise if you start to have pain. Your doctor or physical therapist will tell you when you can start these exercises and which ones will work best for you. How to do the exercises Lower neck and upper back stretch 1. Stretch your arms out in front of your body. Clasp one hand on top of your other hand. 2. Gently reach out so that you feel your shoulder blades stretching away from each other. 3. Gently bend your head forward. 4. Hold for 15 to 30 seconds. 5. Repeat 2 to 4 times. Midback stretch If you have knee pain, do not do this exercise. 1. Kneel on the floor, and sit back on your ankles. 2. Lean forward, place your hands on the floor, and stretch your arms out in front of you. Rest your head between your arms. 3. Gently push your chest toward the floor, reaching as far in front of you as possible. 4. Hold for 15 to 30 seconds. 5. Repeat 2 to 4 times. Shoulder rolls 1. Sit comfortably with your feet shoulder-width apart. You can also do this exercise while standing. 2. Roll your shoulders up, then back, and then down in a smooth, circular motion. 3. Repeat 2 to 4 times. Wall push-up 1. Stand against a wall with your feet about 12 to 24 inches back from the wall. If you feel any pain when you do this exercise, stand closer to the wall. 2. Place your hands on the wall slightly wider apart than your shoulders, and lean forward. 3. Gently lean your body toward the wall. Then push back to your starting position. Keep the motion smooth and controlled. 4. Repeat 8 to 12 times. Resisted shoulder blade squeeze For this exercise, you will need elastic exercise material, such as surgical tubing or Thera-Band. 1. Sit or stand, holding the band in both hands in front of you. Keep your elbows close to your sides, bent at a 90-degree angle. Your palms should face up. 2. Squeeze your shoulder blades together, and move your arms to the outside, stretching the band. Be sure to keep your elbows at your sides while you do this. 3. Relax. 4. Repeat 8 to 12 times. Resisted rows For this exercise, you will need elastic exercise material, such as surgical tubing or Thera-Band. 1. Put the band around a solid object, such as a bedpost, at about waist level. Hold one end of the band in each hand. 2. With your elbows at your sides and bent to 90 degrees, pull the band back to move your shoulder blades toward each other. Return to the starting position. 3. Repeat 8 to 12 times. Follow-up care is a key part of your treatment and safety. Be sure to make and go to all appointments, and call your doctor if you are having problems. It's also a good idea to know your test results and keep a list of the medicines you take. Where can you learn more? Go to http://linda-haley.info/. Enter B338 in the search box to learn more about \"Healthy Upper Back: Exercises. \" Current as of: March 21, 2017 Content Version: 11.4 © 7985-5299 [employer]. Care instructions adapted under license by [employer] (which disclaims liability or warranty for this information). If you have questions about a medical condition or this instruction, always ask your healthcare professional. [address] any warranty or liability for your use of this information. Introducing [facility] [place] [facility]! Dear Ning Kyle: 
Thank you for requesting a build[employer] account. Our records indicate that you already have an active [employer] account. You can access your account anytime at [email or web address] Did you know that you can access your hospital and ER discharge instructions at any time in buildabrand? You can also review all of your test results from your hospital stay or ER visit. Additional Information If you have questions, please visit the Frequently Asked Questions section of the build[employer]rand website at [email or web address] Remember, build[employer]rand is NOT to be used for urgent needs. For medical emergencies, dial 911. Now available from your iPhone and Android! Please provide this summary of care documentation to your next provider. Your primary care clinician is listed as Kayli Lin. If you have any questions after today's visit, please call [phone].

## 2018-04-25 NOTE — PATIENT INSTRUCTIONS
Back Care and Preventing Injuries: Care Instructions  Your Care Instructions    You can hurt your back doing many everyday activities: lifting a heavy box, bending down to garden, exercising at the gym, and even getting out of bed. But you can keep your back strong and healthy by doing some exercises. You also can follow a few tips for sitting, sleeping, and lifting to avoid hurting your back again. Talk to your doctor before you start an exercise program. Ask for help if you want to learn more about keeping your back healthy. Follow-up care is a key part of your treatment and safety. Be sure to make and go to all appointments, and call your doctor if you are having problems. It's also a good idea to know your test results and keep a list of the medicines you take. How can you care for yourself at home? · Stay at a healthy weight to avoid strain on your lower back. · Do not smoke. Smoking increases the risk of osteoporosis, which weakens the spine. If you need help quitting, talk to your doctor about stop-smoking programs and medicines. These can increase your chances of quitting for good. · Make sure you sleep in a position that maintains your back's normal curves and on a mattress that feels comfortable. Sleep on your side with a pillow between your knees, or sleep on your back with a pillow under your knees. These positions can reduce strain on your back. · When you get out of bed, lie on your side and bend both knees. Drop your feet over the edge of the bed as you push up with both arms. Scoot to the edge of the bed. Make sure your feet are in line with your rear end (buttocks), and then stand up. · If you must stand for a long time, put one foot on a stool, ledge, or box. Exercise to strengthen your back and other muscles  · Get at least 30 minutes of exercise on most days of the week. Walking is a good choice.  You also may want to do other activities, such as running, swimming, cycling, or playing tennis or team sports. · Stretch your back muscles. Here are few exercises to try:  [place] on your back with your knees bent and your feet flat on the floor. Gently pull one bent knee to your chest. Put that foot back on the floor, and then pull the other knee to your chest. Hold for 15 to 30 seconds. Repeat 2 to 4 times. ¨ Do pelvic tilts. Lie on your back with your knees bent. Tighten your stomach muscles. Pull your belly button (navel) in and up toward your ribs. You should feel like your back is pressing to the floor and your hips and pelvis are slightly lifting off the floor. Hold for 6 seconds while breathing smoothly. · Keep your core muscles strong. The muscles of your back, belly (abdomen), and buttocks support your spine. ¨ Pull in your belly, and imagine pulling your navel toward your spine. Hold this for 6 seconds, then relax. Remember to keep breathing normally as you tense your muscles. ¨ Do curl-ups. Always do them with your knees bent. Keep your low back on the floor, and curl your shoulders toward your knees using a smooth, slow motion. Keep your arms folded across your chest. If this bothers your neck, try putting your hands behind your neck (not your head), with your elbows spread apart. ¨ Lie on your back with your knees bent and your feet flat on the floor. Tighten your belly muscles, and then push with your feet and raise your buttocks up a few inches. Hold this position 6 seconds as you continue to breathe normally, then lower yourself slowly to the floor. Repeat 8 to 12 times. ¨ If you like group exercise, try Pilates or yoga. These classes have poses that strengthen the core muscles. Protect your back when you sit  · Place a small pillow, a rolled-up towel, or a lumbar roll in the curve of your back if you need extra support. · Sit in a chair that is low enough to let you place both feet flat on the floor with both knees nearly level with your hips.  If your chair or desk is too high, use a foot rest to raise your knees. · When driving, keep your knees nearly level with your hips. Sit straight, and drive with both hands on the steering wheel. Your arms should be in a slightly bent position. · Try a kneeling chair, which helps tilt your hips forward. This takes pressure off your lower back. · Try sitting on an exercise ball. It can rock from side to side, which helps keep your back loose. Lift properly  · Squat down, bending at the hips and knees only. If you need to, put one knee to the floor and extend your other knee in front of you, bent at a right angle (half kneeling). · Press your chest straight forward. This helps keep your upper back straight while keeping a slight arch in your low back. · Hold the load as close to your body as possible, at the level of your navel. · Use your feet to change direction, taking small steps. · Lead with your hips as you change direction. Keep your shoulders in line with your hips as you move. Do not twist your body. · Set down your load carefully, squatting with your knees and hips only. When should you call for help? Watch closely for changes in your health, and be sure to contact your doctor if you have any problems. Where can you learn more? Go to http://linda-haley.info/. Enter S810 in the search box to learn more about \"Back Care and Preventing Injuries: Care Instructions. \"  Current as of: March 21, 2017  Content Version: 11.4  © 3807-7947 [employer]. Care instructions adapted under license by [employer] (which disclaims liability or warranty for this information). If you have questions about a medical condition or this instruction, always ask your healthcare professional. [place] [address] any warranty or liability for your use of this information. Learning About Medial Branch Block and Neurotomy  What are medial branch block and neurotomy?     Facet joints connect your vertebrae to each other. Problems in these joints can cause chronic (long-term) pain in the neck or back. They can sometimes affect the shoulders, arms, buttocks, or legs. Medial branch nerves are the nerves that carry many of the pain messages from your facet joints. Radiofrequency medial branch neurotomy is a type of medial branch neurotomy that is used to relieve arthritis pain. It uses radio waves to damage nerves in your neck or back so that they can no longer send pain messages to your brain. Before your doctor knows if a neurotomy will help you, he or she will do a medial branch block to find out if certain nerves are the ones that are a source of your pain. You will need two separate visits to the outpatient center or hospital to have both procedures. How is a medial branch block done? The doctor will use a tiny needle to numb the skin where you will get the block. Then he or she puts the block needle into the numbed area. You may feel some pressure, but you should not feel pain. Using fluoroscopy (live X-ray) to guide the needle, the doctor injects medicine onto one or more nerves to make them numb. If you get relief from your pain in the next 4 to 6 hours, it's a sign that those nerves may be contributing to your pain. The relief will last only a short time. You may then have a medial branch neurotomy at a later visit to try to get longer relief. It takes 20 to 30 minutes to get the block. You can go home after the doctor watches you for about an hour. You will get instructions on how to report how much pain you have when you are at home. You will need someone to drive you home. How is medial branch neurotomy done? The doctor will use a tiny needle to numb the skin where you will get the neurotomy. Then he or she puts the neurotomy needle into the numbed area. You may feel some pressure.  Using fluoroscopy (live X-ray) to guide the needle, the doctor sends radio waves through the needle to the nerve for 60 to 90 seconds. The radio waves heat the nerve, which damages it. The doctor may do this several times. And he or she may treat more than one nerve. It takes 45 to 90 minutes to get a neurotomy, depending on how many nerves are heated. You will probably go home 30 to 60 minutes later. You will need someone to drive you home. What can you expect after a neurotomy? You may feel a little sore or tender at the injection site at first. But after a successful neurotomy, most people have pain relief right away. It often lasts for 9 to 12 months or longer. Sometimes the pain relief is permanent. If your pain does come back, it may mean that the damaged nerve has healed and can send pain messages again. Or it can mean that a different nerve is causing pain. Your doctor will discuss your options with you. Follow-up care is a key part of your treatment and safety. Be sure to make and go to all appointments, and call your doctor if you are having problems. It's also a good idea to know your test results and keep a list of the medicines you take. Where can you learn more? Go to http://linda-haley.info/. Enter A389 in the search box to learn more about \"Learning About Medial Branch Block and Neurotomy. \"  Current as of: October 14, 2016  Content Version: 11.4  © 6656-7328 [employer]. Care instructions adapted under license by [employer] (which disclaims liability or warranty for this information). If you have questions about a medical condition or this instruction, always ask your healthcare professional. [place] [address] any warranty or liability for your use of this information. Healthy Upper Back: Exercises  Your Care Instructions  Here are some examples of exercises for your upper back. Start each exercise slowly. Ease off the exercise if you start to have pain.   Your doctor or physical therapist will tell you when you can start these exercises and which ones will work best for you. How to do the exercises  Lower neck and upper back stretch    1. Stretch your arms out in front of your body. Clasp one hand on top of your other hand. 2. Gently reach out so that you feel your shoulder blades stretching away from each other. 3. Gently bend your head forward. 4. Hold for 15 to 30 seconds. 5. Repeat 2 to 4 times. Midback stretch    If you have knee pain, do not do this exercise. 1. Kneel on the floor, and sit back on your ankles. 2. Lean forward, place your hands on the floor, and stretch your arms out in front of you. Rest your head between your arms. 3. Gently push your chest toward the floor, reaching as far in front of you as possible. 4. Hold for 15 to 30 seconds. 5. Repeat 2 to 4 times. Shoulder rolls    1. Sit comfortably with your feet shoulder-width apart. You can also do this exercise while standing. 2. Roll your shoulders up, then back, and then down in a smooth, circular motion. 3. Repeat 2 to 4 times. Wall push-up    1. Stand against a wall with your feet about 12 to 24 inches back from the wall. If you feel any pain when you do this exercise, stand closer to the wall. 2. Place your hands on the wall slightly wider apart than your shoulders, and lean forward. 3. Gently lean your body toward the wall. Then push back to your starting position. Keep the motion smooth and controlled. 4. Repeat 8 to 12 times. Resisted shoulder blade squeeze    For this exercise, you will need elastic exercise material, such as surgical tubing or Thera-Band. 1. Sit or stand, holding the band in both hands in front of you. Keep your elbows close to your sides, bent at a 90-degree angle. Your palms should face up. 2. Squeeze your shoulder blades together, and move your arms to the outside, stretching the band. Be sure to keep your elbows at your sides while you do this. 3. Relax. 4. Repeat 8 to 12 times.   Resisted rows    For this exercise, you will need elastic exercise material, such as surgical tubing or Thera-Band. 1. Put the band around a solid object, such as a bedpost, at about waist level. Hold one end of the band in each hand. 2. With your elbows at your sides and bent to 90 degrees, pull the band back to move your shoulder blades toward each other. Return to the starting position. 3. Repeat 8 to 12 times. Follow-up care is a key part of your treatment and safety. Be sure to make and go to all appointments, and call your doctor if you are having problems. It's also a good idea to know your test results and keep a list of the medicines you take. Where can you learn more? Go to http://linda-haley.info/. Enter T145 in the search box to learn more about \"Healthy Upper Back: Exercises. \"  Current as of: March 21, 2017  Content Version: 11.4  © 4971-3908 [employer]. Care instructions adapted under license by [employer] (which disclaims liability or warranty for this information). If you have questions about a medical condition or this instruction, always ask your healthcare professional. [address] any warranty or liability for your use of this information.

## 2018-04-25 NOTE — PROGRESS NOTES
Verbal order entered per Dr. Tatianna Wiggins as documented on blue sheet:Gabapentin 300mg take 1 tab po QHS. Disp 30 with 2 refills. Naprosyn 500mg take 1 tab po BID with food. Disp 60 with 1 refill. Referral to Neurology for 5 months of shin numbness.

## 2018-06-06 ENCOUNTER — OFFICE VISIT (OUTPATIENT)
Dept: ORTHOPEDIC SURGERY | Age: 53
End: 2018-06-06

## 2018-06-06 VITALS
BODY MASS INDEX: 31.08 KG/M2 | DIASTOLIC BLOOD PRESSURE: 96 MMHG | HEIGHT: 71 IN | OXYGEN SATURATION: 98 % | WEIGHT: 222 LBS | SYSTOLIC BLOOD PRESSURE: 138 MMHG | HEART RATE: 74 BPM

## 2018-06-06 DIAGNOSIS — M25.60 MORNING JOINT STIFFNESS: ICD-10-CM

## 2018-06-06 DIAGNOSIS — M43.16 SPONDYLOLISTHESIS OF LUMBAR REGION: Primary | ICD-10-CM

## 2018-06-06 DIAGNOSIS — M47.816 LUMBAR FACET ARTHROPATHY: ICD-10-CM

## 2018-06-06 DIAGNOSIS — L40.50 PSA (PSORIATIC ARTHRITIS) (HCC): ICD-10-CM

## 2018-06-06 RX ORDER — GABAPENTIN 100 MG/1
CAPSULE ORAL
COMMUNITY
Start: 2018-04-24 | End: 2018-06-08

## 2018-06-06 RX ORDER — NAPROXEN 500 MG/1
TABLET ORAL
Qty: 180 TAB | Refills: 1 | Status: SHIPPED | OUTPATIENT
Start: 2018-06-06 | End: 2019-05-15

## 2018-06-06 RX ORDER — ETANERCEPT 50 MG/ML
SOLUTION SUBCUTANEOUS
COMMUNITY
Start: 2018-05-11

## 2018-06-06 RX ORDER — METHYLPREDNISOLONE 4 MG/1
TABLET ORAL
Qty: 1 DOSE PACK | Refills: 0 | Status: SHIPPED | OUTPATIENT
Start: 2018-06-06 | End: 2018-06-06 | Stop reason: CLARIF

## 2018-06-06 RX ORDER — METHYLPREDNISOLONE 4 MG/1
TABLET ORAL
Qty: 1 DOSE PACK | Refills: 0 | Status: SHIPPED | OUTPATIENT
Start: 2018-06-06 | End: 2019-05-15 | Stop reason: ALTCHOICE

## 2018-06-06 RX ORDER — ISOTRETINOIN 40 MG/1
CAPSULE ORAL
COMMUNITY
Start: 2018-05-31

## 2018-06-06 NOTE — PROGRESS NOTES
[facility] [address]  [address], [address]  [place] [address]  Phone: [phone]  Fax: [phone]        Dee Fry  : 1965  PCP: Lovely Ybarra MD    PROGRESS NOTE      ASSESSMENT AND PLAN    Diagnoses and all orders for this visit:    1. Spondylolisthesis of lumbar region  -     naproxen (NAPROSYN) 500 mg tablet; Take 1 tab po BID with food    2.  suspicion of PSA (psoriatic arthritis) (HCC)  -     [86181] Pelvis 1-2 views  -     REFERRAL TO RHEUMATOLOGY  -     methylPREDNISolone (MEDROL DOSEPACK) 4 mg tablet; Per dose pack instructions    3. Morning joint stiffness  -     REFERRAL TO RHEUMATOLOGY    4. Lumbar facet arthropathy (HCC)  -     naproxen (NAPROSYN) 500 mg tablet; Take 1 tab po BID with food    1. Advised to continue HEP. 2. Continue Naprosyn. 3. Will see Neurology Friday. 4. May take Tylenol. Do not mix NSAIDs. 5. DC Gabapentin. If he has an increase in symptoms, may return to lowest effective Gabapentin. 6. Rx for MDP, do not take with NSAIDs. 7. Referral to rheumatology- eval for psoriatic arthritis. +hx psoriasis, on Enbrel; hand and SI joint pain w/am stiffness  8. Given exercises. Follow-up Disposition:  Return in about 3 months (around 2018). HISTORY OF PRESENT ILLNESS  Diaz Frank is a 47 y.o. male. Pt presents to the office for a f/u visit for back pain. He was given a trial of Naprosyn last visit and Gabapentin was increased to 300 mg QHS. Pt was referred to neurology for B/L shin numbness since 2017    He has good and bad days with his pain. He notes that there are days when he tries to get up and has stiffness in his back. He has morning stiffness every day that lasts a few hours. He notes that he has some stiffness in his hands but notes he has worked with his hands his entire life. He feels some relief from Naprosyn. No GI upset. Pt has not noticed any difference with increased Gabapentin.  He notes that there are times when his pain will flare without cause. He notes that his pain will flare up with trying to rise from a seated position. He has noticed that standing will actually help relieve some pain. His pain is worse with sitting vs standing. Sitting in a car will also increase his pain. He has noticed that the numbness in his RLE is more of an ache now. He has muscle spasms in his back. He gets some soreness in his hips with prolonged walking. Pt reports pain does not radiate into the BLE. Pt denies weakness in BLE. No UE symptoms. Pt denies saddle paresthesias. Pt states he has been using Tylenol with Naprosyn with some relief. His appointment with neurology is Friday. Did not recall much relief from MDP. He notes that he had severe somnolence from previous muscle relaxer. Denies persistent fevers, chills, weight changes, neurogenic bowel or bladder symptoms. Pt denies recent ED visits or hospitalizations.  reviewed. No personal or family Hx of RA that he is able to recall. He does some exercises but nothing routine. Pt has tried; PT-Yes,  Non-opioid medications Yes,  spinal injections No, and spinal surgery No. He works at [employer] as an . PMHx of psoriasis, reflux. Was told by his dermatologist that he has a possibility of having psoriatic arthritis, pt unsure if he does have this. He notes that after he was Dx with psoriasis, he was put on Enbrel and started working out more. After starting the Enbrel, he noticed a decrease in his pain. He is unsure if it was the Enbrel or working out that relieved his pain. He is still on Enbrel.      Pain Assessment  6/6/2018   Location of Pain Back   Pain Location Comment -   Location Modifiers Inferior   Severity of Pain 3   Quality of Pain Dull;Aching   Duration of Pain Persistent   Frequency of Pain Constant   Aggravating Factors (No Data)   Aggravating Factors Comment nothing   Limiting Behavior Yes   Relieving Factors Ice   Result of Injury - PAST MEDICAL HISTORY   Past Medical History:   Diagnosis Date    Closed fracture of metacarpal bone(s), site unspecified     Esophageal reflux     Hyperlipidemia     Psoriasis        Past Surgical History:   Procedure Laterality Date    HX ORTHOPAEDIC      left hand   . MEDICATIONS      Current Outpatient Prescriptions   Medication Sig Dispense Refill    ENBREL SURECLICK 50 mg/mL (8.10 mL) injection       gabapentin (NEURONTIN) 100 mg capsule       AMNESTEEM 40 mg capsule       naproxen (NAPROSYN) 500 mg tablet Take 1 tab po BID with food 180 Tab 1    methylPREDNISolone (MEDROL DOSEPACK) 4 mg tablet Per dose pack instructions 1 Dose Pack 0    gabapentin (NEURONTIN) 300 mg capsule Take 1 Cap by mouth nightly. 30 Cap 2    atorvastatin (LIPITOR) 40 mg tablet       fenofibrate nanocrystallized (TRICOR) 48 mg tablet           ALLERGIES  No Known Allergies       SOCIAL HISTORY    Social History     Social History    Marital status:      Spouse name: N/A    Number of children: N/A    Years of education: N/A     Occupational History    Not on file. Social History Main Topics    Smoking status: Never Smoker    Smokeless tobacco: Never Used    Alcohol use Not on file    Drug use: Not on file    Sexual activity: Not on file     Other Topics Concern    Not on file     Social History Narrative     Social History Narrative      Problem Relation Age of Onset    High Cholesterol Other     Diabetes Other     Breast Cancer Other     Cancer Mother      breast    Diabetes Father      type 2    Hypertension Father        REVIEW OF SYSTEMS  Review of Systems   Constitutional: Negative for chills, fever and weight loss. Respiratory: Negative for shortness of breath. Cardiovascular: Negative for chest pain. Gastrointestinal: Negative for constipation. Negative for fecal incontinence   Genitourinary: Negative for dysuria.         Negative for urinary incontinence Musculoskeletal:        Per HPI   Skin: Negative for rash. Neurological: Positive for tingling and sensory change. Negative for dizziness, tremors, focal weakness and headaches. Endo/Heme/Allergies: Does not bruise/bleed easily. Psychiatric/Behavioral: The patient does not have insomnia. PHYSICAL EXAMINATION  Visit Vitals    BP (!) 138/96    Pulse 74    Ht 5' 11\" (1.803 m)    Wt 222 lb (100.7 kg)    SpO2 98%    BMI 30.96 kg/m2         Accompanied by self. Constitutional:  Well developed, well nourished, in no acute distress. Psychiatric: Affect and mood are appropriate. Integumentary: No rashes or abrasions noted on exposed areas. Cardiovascular/Peripheral Vascular: Intact l pulses. No peripheral edema is noted. Lymphatic:  No evidence of lymphedema. No cervical lymphadenopathy. SPINE/MUSCULOSKELETAL EXAM      Lumbar spine:  No rash, ecchymosis, or gross obliquity. No fasciculations. No focal atrophy is noted. Tenderness to palpation L4-5. No tenderness to palpation at the sciatic notch. SI joints tender. Trochanters non tender. Sensation grossly intact to light touch. MOTOR:       Hip Flex  Quads Hamstrings Ankle DF EHL Ankle PF   Right +4/5 +4/5 +4/5 +4/5 +4/5 +4/5   Left +4/5 +4/5 +4/5 +4/5 +4/5 +4/5       Straight Leg raise negative. No difficulty with tandem gait. Heel walk intact. Ambulation without assistive device. FWB. RADIOGRAPHS  Pelvis xray films reviewed:  1) No acute osseous findings. Written by Lashon Santiago, as dictated by Omid Collier MD.    I, Dr. Omid Collier MD, confirm that all documentation is accurate. Mr. Sandra Subramanian may have a reminder for a \"due or due soon\" health maintenance. I have asked that he contact his primary care provider for follow-up on this health maintenance.

## 2018-06-06 NOTE — PROGRESS NOTES
Verbal order entered per Dr. Angela Haq as documented on blue sheet:  -MDP printed  -refer to rheumatology, lower back pain, AM stiffness for hours, psoriasis 2008, enbrel use

## 2018-06-06 NOTE — MR AVS SNAPSHOT
[facility] [address] [facility] [address] 
[phone] Patient: Jennifer Castro MRN: [id number] :1965 Visit Information Date & Time Provider Department Dept. Phone Encounter #  
 2018  8:40 AM Anais Camacho MD [facility] [phone] [id number] Follow-up Instructions Return in about 3 months (around 2018). Your Appointments 2018  9:30 AM  
New Patient with Isael Wall NP [address] at [address] [facility]) Appt Note: Dr. Stefanie Moses Sensation Disturbance/Ref and notes in cc  
 [address] [facility] [address] [facility] [address] [facility] Upcoming Health Maintenance Date Due DTaP/Tdap/Td series (1 - Tdap) 1987 FOBT Q 1 YEAR AGE 50-75 2016 Influenza Age 5 to Adult 2018 Allergies as of 2018  Review Complete On: 2018 By: Janneth Juares No Known Allergies Current Immunizations  Never Reviewed No immunizations on file. Not reviewed this visit You Were Diagnosed With   
  
 Codes Comments Lumbar facet arthropathy (HCC)    -  Primary ICD-10-CM: M46.96 
ICD-9-CM: 721.3 Spondylolisthesis of lumbar region     ICD-10-CM: M43.16 
ICD-9-CM: 738.4 Morning joint stiffness     ICD-10-CM: M25.60 ICD-9-CM: 719.50 Vitals BP Pulse Height(growth percentile) Weight(growth percentile) SpO2 BMI  
 (!) 138/96 74 5' 11\" (1.803 m) 222 lb (100.7 kg) 98% 30.96 kg/m2 Smoking Status Never Smoker BMI and BSA Data Body Mass Index Body Surface Area 30.96 kg/m 2 2.25 m 2 Preferred Pharmacy Pharmacy Name Phone [facility] Your Updated Medication List  
  
   
 This list is accurate as of 6/6/18 10:17 AM.  Always use your most recent med list.  
  
  
  
  
 AMNESTEEM 40 mg capsule Generic drug:  ISOtretinoin  
  
 atorvastatin 40 mg tablet Commonly known as:  LIPITOR  
  
 ENBREL SURECLICK 50 mg/mL (9.45 mL) injection Generic drug:  etanercept  
  
 fenofibrate nanocrystallized 48 mg tablet Commonly known as:  TRICOR  
  
 * gabapentin 100 mg capsule Commonly known as:  NEURONTIN  
  
 * gabapentin 300 mg capsule Commonly known as:  NEURONTIN Take 1 Cap by mouth nightly. methylPREDNISolone 4 mg tablet Commonly known as:  Margorie Hanly Per dose pack instructions  
  
 naproxen 500 mg tablet Commonly known as:  NAPROSYN Take 1 tab po BID with food * Notice: This list has 2 medication(s) that are the same as other medications prescribed for you. Read the directions carefully, and ask your doctor or other care provider to review them with you. Prescriptions Printed Refills  
 methylPREDNISolone (MEDROL DOSEPACK) 4 mg tablet 0 Sig: Per dose pack instructions Class: Print Prescriptions Sent to Pharmacy Refills  
 naproxen (NAPROSYN) 500 mg tablet 1 Sig: Take 1 tab po BID with food Class: Normal  
 Pharmacy: [address], [place] Ph #: [phone] We Performed the Following POC XRAY, PELVIS; 1 OR 2 VIEWS [97869 CPT(R)] REFERRAL TO RHEUMATOLOGY [AVG51 Custom] Comments:  
 Lower back pain, AM stiffness for hours, psoriasis, enbrel use Follow-up Instructions Return in about 3 months (around 9/6/2018). Referral Information Referral ID Referred By Referred To  
  
 [id number] Blaine Bone Not Available Visits Status Start Date End Date 1 New Request 6/6/18 6/6/19 If your referral has a status of pending review or denied, additional information will be sent to support the outcome of this decision. Patient Instructions Spondylolysis and Spondylolisthesis: Exercises Your Care Instructions Here are some examples of typical rehabilitation exercises for your condition. Start each exercise slowly. Ease off the exercise if you start to have pain. Your doctor or physical therapist will tell you when you can start these exercises and which ones will work best for you. How to do the exercises Single knee-to-chest 
 
1. Lie on your back with your knees bent and your feet flat on the floor. You can put a small pillow under your head and neck if it is more comfortable. 2. Bring one knee to your chest, keeping the other foot flat on the floor. 3. Keep your lower back pressed to the floor. Hold for 15 to 30 seconds. 4. Relax, and lower the knee to the starting position. 5. Repeat with the other leg. Repeat 2 to 4 times with each leg. 6. To get more stretch, put your other leg flat on the floor while pulling your knee to your chest. 
Double knee-to-chest 
 
1. Lie on your back with your knees bent and your feet flat on the floor. You can put a small pillow under your head and neck if it is more comfortable. 2. Bring both knees to your chest. 
3. Keep your lower back pressed to the floor. Hold for 15 to 30 seconds. 4. Relax, and lower your knees to the starting position. 5. Repeat 2 to 4 times. Alternate arm and leg (bird dog) exercise Do this exercise slowly. Try to keep your body straight at all times. 1. Start on the floor, on your hands and knees. 2. Tighten your belly muscles by pulling your belly button in toward your spine. Be sure you continue to breathe normally and do not hold your breath. 3. Raise one arm off the floor, and hold it straight out in front of you. Be careful not to let your shoulder drop down, because that will twist your trunk. 4. Hold for about 6 seconds, then lower your arm and switch to your other arm. 5. Repeat 8 to 12 times on each arm. 6. When you can do this exercise with ease and no pain, repeat steps 1 through 5. But this time do it with one leg raised off the floor, holding your leg straight out behind you. Be careful not to let your hip drop down, because that will twist your trunk. 7. When holding your leg straight out becomes easier, try raising your opposite arm at the same time, and repeat steps 1 through 5. Bridging 1. Lie on your back with both knees bent. Your knees should be bent about 90 degrees. 2. Then push your feet into the floor, squeeze your buttocks, and lift your hips off the floor until your shoulders, hips, and knees are all in a straight line. 3. Hold for about 6 seconds as you continue to breathe normally, and then slowly lower your hips back down to the floor and rest for up to 10 seconds. 4. Repeat 8 to 12 times. Curl-ups 1. Lie on the floor on your back with your knees bent at a 90-degree angle. Your feet should be flat on the floor, about 12 inches from your buttocks. 2. Cross your arms over your chest. If this bothers your neck, try putting your hands behind your neck (not your head), with your elbows spread apart. 3. Slowly tighten your belly muscles and raise your shoulder blades off the floor. 4. Keep your head in line with your body, and do not press your chin to your chest. 
5. Hold this position for 1 or 2 seconds, then slowly lower yourself back down to the floor. 6. Repeat 8 to 12 times. Deepthi Shall Do this exercise slowly. Try to keep your body straight at all times, and do not let one hip drop lower than the other. 1. Lie on your stomach, resting your upper body on your forearms. 2. Tighten your belly muscles by pulling your belly button in toward your spine. 3. Keeping your knees on the floor, press down with your forearms to lift your upper body off the floor. 4. Hold for about 6 seconds, then lower your body to the floor. Rest for up to 10 seconds. 5. Repeat 8 to 12 times. 6. Over time, work up to holding for 15 to 30 seconds each time. 7. If this exercise is easy to do with your knees on the floor, try doing this exercise with your knees and legs straight, supported by your toes on the floor. Follow-up care is a key part of your treatment and safety. Be sure to make and go to all appointments, and call your doctor if you are having problems. It's also a good idea to know your test results and keep a list of the medicines you take. Where can you learn more? Go to http://linda-haley.info/. Enter [phone] in the search box to learn more about \"Spondylolysis and Spondylolisthesis: Exercises. \" Current as of: March 21, 2017 Content Version: 11.4 © 4730-3294 [employer]. Care instructions adapted under license by [employer] (which disclaims liability or warranty for this information). If you have questions about a medical condition or this instruction, always ask your healthcare professional. [address] any warranty or liability for your use of this information. Introducing [facility] & HEALTH SERVICES! Dear Annabelle Christensen: 
Thank you for requesting a Scrip-t account. Our records indicate that you already have an active Scrip-t account. You can access your account anytime at [email or web address] Did you know that you can access your hospital and ER discharge instructions at any time in Scrip-t? You can also review all of your test results from your hospital stay or ER visit. Additional Information If you have questions, please visit the Frequently Asked Questions section of the Scrip-t website at [email or web address] Remember, Scrip-t is NOT to be used for urgent needs. For medical emergencies, dial 911. Now available from your iPhone and Android! Please provide this summary of care documentation to your next provider. Your primary care clinician is listed as Conception Nim. If you have any questions after today's visit, please call [phone].

## 2018-06-06 NOTE — PATIENT INSTRUCTIONS
Spondylolysis and Spondylolisthesis: Exercises  Your Care Instructions  Here are some examples of typical rehabilitation exercises for your condition. Start each exercise slowly. Ease off the exercise if you start to have pain. Your doctor or physical therapist will tell you when you can start these exercises and which ones will work best for you. How to do the exercises  Single knee-to-chest    1. Lie on your back with your knees bent and your feet flat on the floor. You can put a small pillow under your head and neck if it is more comfortable. 2. Bring one knee to your chest, keeping the other foot flat on the floor. 3. Keep your lower back pressed to the floor. Hold for 15 to 30 seconds. 4. Relax, and lower the knee to the starting position. 5. Repeat with the other leg. Repeat 2 to 4 times with each leg. 6. To get more stretch, put your other leg flat on the floor while pulling your knee to your chest.  Double knee-to-chest    1. Lie on your back with your knees bent and your feet flat on the floor. You can put a small pillow under your head and neck if it is more comfortable. 2. Bring both knees to your chest.  3. Keep your lower back pressed to the floor. Hold for 15 to 30 seconds. 4. Relax, and lower your knees to the starting position. 5. Repeat 2 to 4 times. Alternate arm and leg (bird dog) exercise    Do this exercise slowly. Try to keep your body straight at all times. 1. Start on the floor, on your hands and knees. 2. Tighten your belly muscles by pulling your belly button in toward your spine. Be sure you continue to breathe normally and do not hold your breath. 3. Raise one arm off the floor, and hold it straight out in front of you. Be careful not to let your shoulder drop down, because that will twist your trunk. 4. Hold for about 6 seconds, then lower your arm and switch to your other arm. 5. Repeat 8 to 12 times on each arm.   6. When you can do this exercise with ease and no pain, repeat steps 1 through 5. But this time do it with one leg raised off the floor, holding your leg straight out behind you. Be careful not to let your hip drop down, because that will twist your trunk. 7. When holding your leg straight out becomes easier, try raising your opposite arm at the same time, and repeat steps 1 through 5. Bridging    1. Lie on your back with both knees bent. Your knees should be bent about 90 degrees. 2. Then push your feet into the floor, squeeze your buttocks, and lift your hips off the floor until your shoulders, hips, and knees are all in a straight line. 3. Hold for about 6 seconds as you continue to breathe normally, and then slowly lower your hips back down to the floor and rest for up to 10 seconds. 4. Repeat 8 to 12 times. Curl-ups    1. Lie on the floor on your back with your knees bent at a 90-degree angle. Your feet should be flat on the floor, about 12 inches from your buttocks. 2. Cross your arms over your chest. If this bothers your neck, try putting your hands behind your neck (not your head), with your elbows spread apart. 3. Slowly tighten your belly muscles and raise your shoulder blades off the floor. 4. Keep your head in line with your body, and do not press your chin to your chest.  5. Hold this position for 1 or 2 seconds, then slowly lower yourself back down to the floor. 6. Repeat 8 to 12 times. Plank    Do this exercise slowly. Try to keep your body straight at all times, and do not let one hip drop lower than the other. 1. Lie on your stomach, resting your upper body on your forearms. 2. Tighten your belly muscles by pulling your belly button in toward your spine. 3. Keeping your knees on the floor, press down with your forearms to lift your upper body off the floor. 4. Hold for about 6 seconds, then lower your body to the floor. Rest for up to 10 seconds. 5. Repeat 8 to 12 times.   6. Over time, work up to holding for 15 to 30 seconds each time.  7. If this exercise is easy to do with your knees on the floor, try doing this exercise with your knees and legs straight, supported by your toes on the floor. Follow-up care is a key part of your treatment and safety. Be sure to make and go to all appointments, and call your doctor if you are having problems. It's also a good idea to know your test results and keep a list of the medicines you take. Where can you learn more? Go to http://linda-haley.info/. Enter [phone] in the search box to learn more about \"Spondylolysis and Spondylolisthesis: Exercises. \"  Current as of: March 21, 2017  Content Version: 11.4  © 3048-1167 [employer]wise[employer]. Care instructions adapted under license by [employer] (which disclaims liability or warranty for this information). If you have questions about a medical condition or this instruction, always ask your healthcare professional. [place] [address] any warranty or liability for your use of this information.

## 2018-06-08 ENCOUNTER — OFFICE VISIT (OUTPATIENT)
Dept: NEUROLOGY | Age: 53
End: 2018-06-08

## 2018-06-08 VITALS
HEIGHT: 71 IN | DIASTOLIC BLOOD PRESSURE: 100 MMHG | RESPIRATION RATE: 18 BRPM | BODY MASS INDEX: 30.63 KG/M2 | HEART RATE: 75 BPM | OXYGEN SATURATION: 98 % | SYSTOLIC BLOOD PRESSURE: 160 MMHG | WEIGHT: 218.8 LBS

## 2018-06-08 DIAGNOSIS — R20.2 NUMBNESS AND TINGLING OF BOTH LOWER EXTREMITIES: Primary | ICD-10-CM

## 2018-06-08 DIAGNOSIS — R20.0 NUMBNESS AND TINGLING OF BOTH LOWER EXTREMITIES: Primary | ICD-10-CM

## 2018-06-08 NOTE — PROGRESS NOTES
[facility]  [address] [facility], [place], [address]  [address] [place], [address]  Office:  [phone]  Fax: [phone]    Referring: Linh Francisco MD  PCP: Wili Meyer MD    Chief Complaint   Patient presents with   [facility] Establish Care     NP:  Disturbances in skin sensation. Patient states that he has had numbness and aching in both shins since Nov 2017. This is a 60-year-old male who presents for new patient evaluation with chief complaint of numbness to both shins. He says this started back in November 2017. Denies inciting factor or injury. One morning he woke up and hisback was hurting. He said icing did not help. He said later that week he felt his shins were numb. This is a constant  Tingling sensation that can be worse at times. Anytime his back is bad he said the shins can be worse. Associated with an achiness. Denies pain coming from his lower back going down the leg. Denies weakness. Denies falls. Denies loss of bowel or bladder function. Denies trouble with his nature. Significant diagnosis of psoriasis on Ebrel. He sees dermatologist Dr. Viola Jernigan. He will be seeing a rheumatologist for suspected psoriatic arthtitis. He also sees orthopedist Dr. Cassidy Guaman for back, hip, and leg pain. He is taking Naproxen for his back. He has recent MRI to his lumbar spine. Denies history of diabetes mellitus.     Past Medical History:   Diagnosis Date    Closed fracture of metacarpal bone(s), site unspecified     Esophageal reflux     Hyperlipidemia     Psoriasis        Past Surgical History:   Procedure Laterality Date    HX ORTHOPAEDIC      left hand       Current Outpatient Prescriptions   Medication Sig Dispense Refill    ENBREL SURECLICK 50 mg/mL (7.41 mL) injection       AMNESTEEM 40 mg capsule       naproxen (NAPROSYN) 500 mg tablet Take 1 tab po BID with food 180 Tab 1    gabapentin (NEURONTIN) 300 mg capsule Take 1 Cap by mouth nightly. 30 Cap 2    atorvastatin (LIPITOR) 40 mg tablet       fenofibrate nanocrystallized (TRICOR) 48 mg tablet       methylPREDNISolone (MEDROL DOSEPACK) 4 mg tablet Per dose pack instructions 1 Dose Pack 0        No Known Allergies    Social History   Substance Use Topics    Smoking status: Never Smoker    Smokeless tobacco: Never Used    Alcohol use None       Family History   Problem Relation Age of Onset    High Cholesterol Other     Diabetes Other     Breast Cancer Other     Cancer Mother      breast    Diabetes Father      type 2    Hypertension Father        Review of Systems:  Pertinent positives and negatives as noted otherwise comprehensive review is negative. Physical Examination:    Visit Vitals    BP (!) 160/100    Pulse 75    Resp 18    Ht 5' 11\" (1.803 m)    Wt 99.2 kg (218 lb 12.8 oz)    SpO2 98%    BMI 30.52 kg/m2     General:  Well defined, nourished, and groomed individual in no acute distress. Neck: Supple, nontender, no bruits. Heart: Regular rate and rhythm, no murmurs, rub, or gallop. Normal S1S2. Lungs:  Clear to auscultation bilaterally with equal chest expansion, no cough, no wheeze  Musculoskeletal:  Extremities revealed no edema. Psych:  Good mood and bright affect    Neurological Examination:     Mental Status:   Alert and oriented to person, place, and time with recent and remote memory intact. Attention span and concentration are normal. Speech is fluent with a full fund of knowledge. Cranial Nerves:    II, III, IV, VI:  Visual acuity grossly intact. Visual fields are normal.    Pupils are equal, round. Extra-ocular movements are full and fluid. No ptosis or nystagmus. V-XII: Hearing is grossly intact. Facial features are symmetric. The palate rises symmetrically and the tongue protrudes midline. Sternocleidomastoids 5/5. Motor Examination: Normal tone, bulk, and strength, 5/5 muscle strength throughout.   No cogwheel rigidity or clonus present. Sensory exam:  Normal throughout to pinprick, temperature, and vibration sense. Normal proprioception. No appreciated abnormality in sensation located at bilateral shins. Coordination:  Finger to nose was normal.   No resting or intention tremor    Gait and Station:  Steady gait. Normal arm swing. No Rhomberg or pronator drift. No muscle wasting or fasiculations noted. Reflexes:  DTRs symmetrical throughout toes downgoing. Impression/Plan  Kermit Hickey is a 47 y.o. male whose history and physical are consistent with numbness and tingling to lower extremities. Patient presents for evaluation of numbness to bilateral shins. He says this is been going on since November. He has associated lower back pain. He is seen by orthopedist for this. Denies inciting factor or injury. Significant diagnosis of psoriasis and he is on Enbrel. He has upcoming appointment with rheumatology for evaluation of psoriatic arthritis. Differential diagnosis includes neuropathy versus compression versus other. We will order bilateral lower extremity EMG. He obviously had MRI of his lumbar spine obtained by his orthopedist.  This is in connect care for review. Blood pressure is elevated today 160/100. Continue to monitor and defer management to his primary care provider. He will follow-up after testing is complete. Discussed this plan with patient and he verbalized understanding. All questions addressed. Diagnoses and all orders for this visit:    1. Numbness and tingling of both lower extremities  -     EMG LIMITED    Signed By: Michelle Guy NP    This note will not be viewable in [address] This note was created using voice recognition software. Despite editing, there may be syntax errors.

## 2018-06-08 NOTE — PROGRESS NOTES
Chief Complaint   Patient presents with   [facility] Establish Care     NP:  Disturbances in skin sensation. Patient states that he has had numbness and aching in both shins since Nov 2017. Patient placed in room 2. Chart and medication reviewed with patient.

## 2018-06-08 NOTE — MR AVS SNAPSHOT
[address] [facility] [address] 
 
 
 [address] [facility] 
[phone] Patient: Shital Briones MRN: [id number] :1965 Visit Information Date & Time Provider Department Dept. Phone Encounter #  
 2018  9:30 AM Sunshine Chu NP [facility] [facility] at [address] [place] [address] [id number] Follow-up Instructions Return for after test.  
  
Your Appointments 2018 10:15 AM  
Follow Up with Manuel Meyers MD  
[facility] ([address] [place] [address]) Appt Note: 3 MO F/U  
 [address] [facility] [address] [phone]  
  
   
 [address] [facility] [address] Upcoming Health Maintenance Date Due DTaP/Tdap/Td series (1 - Tdap) 1987 FOBT Q 1 YEAR AGE 50-75 2016 Influenza Age 5 to Adult 2018 Allergies as of 2018  Review Complete On: 2018 By: Sunshine Chu NP No Known Allergies Current Immunizations  Never Reviewed No immunizations on file. Not reviewed this visit You Were Diagnosed With   
  
 Codes Comments Numbness and tingling of both lower extremities    -  Primary ICD-10-CM: R20.0, R20.2 ICD-9-CM: 731. 0 Vitals BP Pulse Resp Height(growth percentile) Weight(growth percentile) SpO2  
 (!) 160/100 75 18 5' 11\" (1.803 m) 218 lb 12.8 oz (99.2 kg) 98% BMI Smoking Status 30.52 kg/m2 Never Smoker BMI and BSA Data Body Mass Index Body Surface Area 30.52 kg/m 2 2.23 m 2 Preferred Pharmacy Pharmacy Name Phone [address] [facility] Your Updated Medication List  
  
   
This list is accurate as of 18  9:56 AM.  Always use your most recent med list.  
  
  
  
  
 AMNESTEEM 40 mg capsule Generic drug:  ISOtretinoin  
  
 atorvastatin 40 mg tablet Commonly known as:  LIPITOR ENBREL SURECLICK 50 mg/mL (1.25 mL) injection Generic drug:  etanercept  
  
 fenofibrate nanocrystallized 48 mg tablet Commonly known as:  TRICOR  
  
 gabapentin 300 mg capsule Commonly known as:  NEURONTIN Take 1 Cap by mouth nightly. methylPREDNISolone 4 mg tablet Commonly known as:  Jerelenrobina Willem Per dose pack instructions  
  
 naproxen 500 mg tablet Commonly known as:  NAPROSYN Take 1 tab po BID with food We Performed the Following EMG LIMITED [[id number] Custom] Follow-up Instructions Return for after test.  
  
  
Introducing \[facility]\"" & HEALTH SERVICES! Dear Yesi Glynn: 
Thank you for requesting a Snoball account. Our records indicate that you already have an active Snoball account. You can access your account anytime at [email or web address] Did you know that you can access your hospital and ER discharge instructions at any time in Snoball? You can also review all of your test results from your hospital stay or ER visit. Additional Information If you have questions, please visit the Frequently Asked Questions section of the Snoball website at [email or web address] Remember, Snoball is NOT to be used for urgent needs. For medical emergencies, dial 911. Now available from your iPhone and Android! Please provide this summary of care documentation to your next provider. Your primary care clinician is listed as Nataliia Valle. If you have any questions after today's visit, please call [phone].

## 2018-06-20 NOTE — PROGRESS NOTES
[facility]  [address]  [place], [address]  [phone] fax    Physical Therapy Discharge Summary  Patient name: Mikal Cruz Start of Care: 2017   Referral source: Ben Mendoza MD : 1965                          Medical Diagnosis: Low back pain [M54.5] Onset Date:5 weeks ago                          Treatment Diagnosis: back pain   Prior Hospitalization: see medical history Provider#: [id number]   Medications: Verified on Patient summary List    Comorbidities: psoriatic arthritis, back pain, HA, sleep dysfunction   Prior Level of Function: Unlimited standing tolerance, no LE symptoms.  Recreational exerciser up unitl 2 months ago - heavy machine based weight circuits maxing out on doable weights for all global muscle groups.      Visits from Start of Care: 12    Missed Visits: 0  Reporting Period : 2018 to 3/21/2018      Summary of Care:  Patient is D/C today with updated HEP with emphasis on gym-based activities. He went to the spine specialist and was prescribed Gabapentin and is scheduled for MRI. Updated Goals: to be achieved in 4 weeks:  1.  Pt will increase pain free standing tolerance to 30 minutes to increase ease of ADLs. -met, patient reports 1/2 hour (3/14/2018)  2.  Pt will be progressed towards a gym based functional treatment exercise program for continued self treatment post discharge. met (3/21/2018)    ASSESSMENT/RECOMMENDATIONS:  [x]Discontinue therapy: [x]Patient has reached or is progressing toward set goals      []Patient is non-compliant or has abdicated      []Due to lack of appreciable progress towards set goals    Pawel Hines, PT 2018 10:26 AM

## 2018-07-05 ENCOUNTER — OFFICE VISIT (OUTPATIENT)
Dept: NEUROLOGY | Age: 53
End: 2018-07-05

## 2018-07-05 DIAGNOSIS — R20.2 NUMBNESS AND TINGLING OF BOTH LOWER EXTREMITIES: Primary | ICD-10-CM

## 2018-07-05 DIAGNOSIS — R20.0 NUMBNESS AND TINGLING OF BOTH LOWER EXTREMITIES: Primary | ICD-10-CM

## 2018-07-05 NOTE — PROGRESS NOTES
[facility]  [facility] [address] [facility] [address]  [facility] [phone]    Neurophysiology Report    Patient: Deysi Nava     ID: [id number] Physician: Virgilio Mitchell. Maryam Harrington MD   Gender: Male Ref Phys: Ovi Arguelles NP   Handedness:      Study Date: July 5, 2018         Patient History: This 81-year-old man has had numbness and tingling in both of his legs for the past several months.     Nerve Conduction Studies  Anti Sensory Summary Table     Stim Site NR Peak (ms) Norm Peak (ms) O-P Amp (µV) Norm O-P Amp Dist (cm) Lupillo (m/s)   Left Sural Anti Sensory (Ankle)   Calf    2.2 <4.4 2.3 >6.0 14.0 77.8       2.5  1.1      Right Sural Anti Sensory (Ankle)   Calf    3.3 <4.4 2.1 >6.0 14.0 51.9       3.4  2.4        Motor Summary Table     Stim Site NR Onset (ms) Norm Onset (ms) O-P Amp (mV) Norm O-P Amp Dist (cm) Lupillo (m/s) Norm Lupillo (m/s)   Left Peroneal Motor (EDB)   Ankle    4.7 <6.5 5.2 >2.0 33.0 44.0 >44   Fibula (Head)    12.2  6.2  10.0 38.5    Pop Fossa    14.8  5.3       Right Peroneal Motor (EDB)   Ankle    4.6 <6.5 3.4 >2.0 39.0 47.0 >44   Fibula (Head)    12.9  2.8  10.0 62.5    Pop Fossa    14.5  2.4       Left Tibial Motor (Abd Guerrero Brev)   Ankle    3.9 <5.8 7.3 >4.0 45.0 41.3 >41   Knee    14.8  2.4       Right Tibial Motor (Abd Guerrero Brev)   Ankle    4.4 <5.8 4.4 >4.0 49.0 45.4 >41   Knee    15.2  2.5           EMG     Side Muscle Nerve Root Ins Act Fibs Psw Fasc Amp Dur Poly Recrt Int Yunior [place] Comment   Right AntTibialis Dp Br Fibular L4-5 Nml Nml Nml None Nml Nml 0 Nml Nml    Right MedGastroc   Nml Nml Nml None Nml Nml 0 Nml Nml    Right VastusMed Femoral L2-4 Nml Nml Nml None Nml Nml 0 Nml Nml    Right BicepsFemS Sciatic L5-S1 Nml Nml Nml None Nml Nml 0 Nml Nml    Right ExtHallLong Dp Br Fibular L5, S1 Nml Nml Nml None Nml Nml 0 Nml Nml    Right Lumbo Parasp Up Rami L1-2 Nml Nml Nml          Right Lumbo Parasp Mid Rami L3-4 Nml Nml Nml          Right Lumbo Parasp Low Rami L5-S1 Nml Nml Nml            NCS/EMG FINDINGS:     Evaluation of the Left peroneal motor, the Right peroneal motor, the Left tibial motor, and the Right tibial motor nerves were unremarkable.  The Left sural sensory and the Right sural sensory nerves showed reduced amplitude (L2.3, R2.1 µV). INTERPRETATION: This was an abnormal nerve conduction and EMG study showing there to be signs of a diffuse sensory neuropathy in both lower extremities. ___________________________  Aravind Steele MD          Waveforms:                [address] AT [facility]  OFFICE PROCEDURE PROGRESS NOTE        Chart reviewed for the following:   Roxanna Kirk MD, have reviewed the History, Physical and updated the Allergic reactions for [address] performed immediately prior to start of procedure:   Roxanna Kirk MD, have performed the following reviews on [address] [place] [address],[facility] prior to the start of the procedure:            * Patient was identified by name and date of birth   * Agreement on procedure being performed was verified  * Risks and Benefits explained to the patient  * Procedure site verified and marked as necessary  * Patient was positioned for comfort  * Consent was signed and verified     Time: 3:48 PM    Date of procedure: 7/5/2018    Procedure performed by:  Brian Dominguez MD    Patient assisted by: self    How tolerated by patient: tolerated the procedure well with no complications    Post Procedural Pain Scale: 0 - No Hurt    Comments: none

## 2018-07-05 NOTE — LETTER
7/5/2018 3:47 PM 
 
Patient:  Charu Lobo YOB: 1965 Date of Visit: 7/5/2018 Dear MD Magali Pittman  [place] [address] VIA Facsimile: [phone] Hector Muse NP 
[address] [facility] [address] [facility] [address] VIA In Basket 
 : Thank you for referring Mr. Pau Edwards to me for evaluation/treatment. Below are the relevant portions of my assessment and plan of care. If you have questions, please do not hesitate to call me. I look forward to following Mr. Tesfaye along with you.  
 
 
 
Sincerely, 
 
 
Santiago Canales MD

## 2018-09-05 ENCOUNTER — OFFICE VISIT (OUTPATIENT)
Dept: ORTHOPEDIC SURGERY | Age: 53
End: 2018-09-05

## 2018-09-05 VITALS
HEART RATE: 71 BPM | HEIGHT: 71 IN | OXYGEN SATURATION: 97 % | SYSTOLIC BLOOD PRESSURE: 135 MMHG | DIASTOLIC BLOOD PRESSURE: 99 MMHG | BODY MASS INDEX: 31.19 KG/M2 | RESPIRATION RATE: 16 BRPM | WEIGHT: 222.8 LBS | TEMPERATURE: 98.5 F

## 2018-09-05 DIAGNOSIS — G60.9 IDIOPATHIC PERIPHERAL NEUROPATHY: ICD-10-CM

## 2018-09-05 DIAGNOSIS — M43.16 SPONDYLOLISTHESIS OF LUMBAR REGION: Primary | ICD-10-CM

## 2018-09-05 RX ORDER — GABAPENTIN 300 MG/1
300 CAPSULE ORAL
Qty: 30 CAP | Refills: 2 | Status: SHIPPED | OUTPATIENT
Start: 2018-09-05 | End: 2019-05-15

## 2018-09-05 NOTE — PROGRESS NOTES
[facility] [address]  [address], [address]  [place], [facility] [address]  Phone: [phone]  Fax: [phone]        Lambert Barragan  : 1965  PCP: Kelli David MD    PROGRESS NOTE      ASSESSMENT AND PLAN    Diagnoses and all orders for this visit:    1. Spondylolisthesis of lumbar region    2. Idiopathic peripheral neuropathy  -     gabapentin (NEURONTIN) 300 mg capsule; Take 1 Cap by mouth nightly as needed. Indications: NEUROPATHIC PAIN       1. Advised to continue HEP. 2. Trial of Gabapentin 300mg QHS PRN  3. Continue Naprosyn   4. Given information on peripheral neuropathy and neuropathic pain. No radiculopathy on EMG/NCV  5. Released from specialty care to PCP. May have future fills through PCP if agreeable. Follow-up Disposition:  Return if symptoms worsen or fail to improve. HISTORY OF PRESENT ILLNESS  Avery Lee is a 47 y.o. male. Pt presents to the office for a f/u visit for back pain. Last OV he was given Naprosyn, DC Gabapentin. Since last visit he was sent for an EMG by Dr. Jose Roa and has been seen by Dr. Daina Montano, rheumatology. Pt reports Dr. Daina Montano cleared him of psoriatic arthritis. Pt is uncertain if Gabapentin made a difference. Pt reports recent sinus issues that caused chronic tiredness which just now cleared up by a sinus flush. Pt states in his 35s he experienced something similar. He states he has not experienced much back pain recently. 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 He indicates he is still amazed that his leg troubles are not from his back since it started the same time as his back pain. Pt reports he walks around on his sailboat. Location of pain: none  Does pain radiate into extremities: none  Does patient have weakness: none   Pt denies saddle paresthesias. Medications pt is on: Naprosyn 500mg QHS and in morning PRN. Pt states this helps him sleep.  Pt reports taking multiple vitamins and Enbrel. Pt denies recent ED visits or hospitalizations. Denies persistent fevers, chills, weight changes, neurogenic bowel or bladder symptoms. Treatments patient has tried:  Physical therapy:Yes  Doing HEP: Yes, elliptical, weights, will work up to yoga  Non-opioid medications: Yes  Spinal injections: No  Spinal surgery- No.        reviewed. He works at [employer] as an . PMHx of psoriasis, reflux. 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 Pain Assessment  9/5/2018   Location of Pain Back;Leg   Pain Location Comment -   Location Modifiers Left;Right; Inferior   Severity of Pain 2   Quality of Pain Dull;Aching; Other (Comment)   Quality of Pain Comment Stabbing; numbness both legs   Duration of Pain A few hours   Frequency of Pain Intermittent   Aggravating Factors Standing;Walking   Aggravating Factors Comment -   Limiting Behavior -   Relieving Factors Exercises   Result of Injury No         EMG 6/8/18     INTERPRETATION: This was an abnormal nerve conduction and EMG study showing there to be signs of a diffuse sensory neuropathy in both lower extremities. PAST MEDICAL HISTORY   Past Medical History:   Diagnosis Date    Closed fracture of metacarpal bone(s), site unspecified     Esophageal reflux     Hyperlipidemia     Psoriasis        Past Surgical History:   Procedure Laterality Date    HX ORTHOPAEDIC      left hand   .       MEDICATIONS    Current Outpatient Prescriptions   Medication Sig Dispense Refill    ENBREL SURECLICK 50 mg/mL (3.34 mL) injection       AMNESTEEM 40 mg capsule       naproxen (NAPROSYN) 500 mg tablet Take 1 tab po BID with food 180 Tab 1    atorvastatin (LIPITOR) 40 mg tablet       fenofibrate nanocrystallized (TRICOR) 48 mg tablet       methylPREDNISolone (MEDROL DOSEPACK) 4 mg tablet Per dose pack instructions 1 Dose Pack 0    gabapentin (NEURONTIN) 300 mg capsule Take 1 Cap by mouth nightly. 30 Cap 2        ALLERGIES  No Known Allergies       SOCIAL HISTORY    Social History     Social History    Marital status:      Spouse name: N/A    Number of children: N/A    Years of education: N/A     Occupational History    Not on file. Social History Main Topics    Smoking status: Never Smoker    Smokeless tobacco: Never Used    Alcohol use Not on file    Drug use: Not on file    Sexual activity: Not on file     Other Topics Concern    Not on file     Social History Narrative       FAMILY HISTORY  Family History   Problem Relation Age of Onset    High Cholesterol Other     Diabetes Other     Breast Cancer Other     Cancer Mother      breast    Diabetes Father      type 2    Hypertension Father        REVIEW OF SYSTEMS  Review of Systems   Constitutional: Negative for chills, fever and weight loss. Respiratory: Negative for shortness of breath. Cardiovascular: Negative for chest pain. Gastrointestinal: Negative for constipation. Negative for fecal incontinence   Genitourinary: Negative for dysuria. Negative for urinary incontinence   Musculoskeletal:        Per HPI   Skin: Negative for rash. Neurological: Positive for tingling. Negative for dizziness, tremors, focal weakness and headaches. Endo/Heme/Allergies: Does not bruise/bleed easily. Psychiatric/Behavioral: The patient does not have insomnia. PHYSICAL EXAMINATION  Visit Vitals    BP (!) 135/99    Pulse 71    Temp 98.5 °F (36.9 °C)    Resp 16    Ht 5' 11\" (1.803 m)    Wt 222 lb 12.8 oz (101.1 kg)    SpO2 97%    BMI 31.07 kg/m2         Accompanied by self. Constitutional:  Well developed, well nourished, in no acute distress. Psychiatric: Affect and mood are appropriate. Integumentary: No rashes or abrasions noted on exposed areas. Cardiovascular/Peripheral Vascular: Intact l pulses. No peripheral edema is noted. Lymphatic:  No evidence of lymphedema.  No cervical lymphadenopathy. SPINE/MUSCULOSKELETAL EXAM      Lumbar spine:  No rash, ecchymosis, or gross obliquity. No fasciculations. No focal atrophy is noted. Tenderness to palpation none. No tenderness to palpation at the sciatic notch. SI joints non-tender. Trochanters non tender. Sensation grossly intact to light touch. MOTOR:       Hip Flex  Quads Hamstrings Ankle DF EHL Ankle PF   Right +4/5 +4/5 +4/5 +4/5 +4/5 +4/5   Left +4/5 +4/5 +4/5 +4/5 +4/5 +4/5       Straight Leg raise negative. No difficulty with tandem gait. Ambulation without assistive device. FWB. Written by Opal Luna, as dictated by Eric Alberts MD.    I, Dr. Eric Alberts MD, confirm that all documentation is accurate. Mr. Cesia Henry may have a reminder for a \"due or due soon\" health maintenance. I have asked that he contact his primary care provider for follow-up on this health maintenance.

## 2018-09-05 NOTE — PATIENT INSTRUCTIONS
Learning About Peripheral Neuropathy  What is peripheral neuropathy? Peripheral neuropathy is a problem that affects the peripheral nerves. These nerves lead from the spinal cord to other parts of the body. They control your sense of touch, how you feel pain and temperature, and your muscle strength. Most of the time the problem starts in the fingers and toes. As it gets worse, it moves into the limbs. It can cause pain and loss of feeling in the feet, legs, and hands. What causes it? There are several causes of peripheral neuropathy:  · Diabetes. This is the most common cause. If your blood sugar is too high for too long, it can damage the nerves. · Kidney problems. These can lead to toxic substances in the blood that damage nerves. · Overusing alcohol and not eating a healthy diet. These can lead to your body not having enough of certain vitamins, such as vitamin B-12. This can damage nerves. · Infectious or inflammatory diseases. These include HIV and Guillain-Barré syndrome. These diseases can damage the nerves. · Being exposed to toxic substances. These include certain medicines, such as those used for chemotherapy. · Low levels of thyroid hormone (hypothyroidism). Sometimes the cause is not known. What are the symptoms? Symptoms of peripheral neuropathy can occur slowly over time. The most common ones are:  · Numbness, tightness, and tingling, especially in the legs, hands, and feet. · Loss of feeling. · Burning, shooting, or stabbing pain in the legs, hands, and feet. Often the pain is worse at night. · Weakness and loss of balance. What can happen if you have it? If peripheral neuropathy gets worse, it can lead to a complete lack of feeling in your hands or feet. This can make you more likely to injure them. It may lead to calluses and blisters. It can also lead to bone and joint problems, infection, and ulcers.   For instance, small, repeated injuries to the foot may lead to bigger problems. This can happen because you can't feel the injuries. Reduced feeling in the feet can also change your step, leading to bone or joint problems. If untreated, foot problems can become so severe that the foot or lower leg may have to be amputated. But treatment can slow down peripheral neuropathy. And it's a good idea to take care to avoid injury. How is it diagnosed? To diagnose peripheral neuropathy, your doctor will ask you about:  · Your symptoms. · Your medical history. This may include your use of alcohol, risk of HIV infection, or exposure to toxic substances. · Your family's medical history, including nerve disease. Your doctor may test how well you can feel touch, temperature, and pain. You may also have blood tests. These tests will help the doctor find out if you have conditions that can cause neuropathy. Examples are diabetes, vitamin deficiencies, thyroid disease, and kidney problems. How is it treated? Treatment for peripheral neuropathy can relieve symptoms. This is done by treating the health problem that's causing it. For example, if you have diabetes, keeping your blood sugar within your target range may help. Or maybe your body lacks certain vitamins caused by drinking too much alcohol. In that case, treatment may include eating a healthy diet, taking vitamins, and stopping alcohol use. You may have physical therapy. This can increase muscle strength and help build muscle control. Over-the-counter medicine can relieve mild nerve pain. Your doctor may also prescribe medicine to help with severe pain, numbness, tingling, and weakness. If you have neuropathy in your feet, it's a good idea to have them checked during each office visit. This can help prevent problems. Some people find that physical therapy, acupuncture, or transcutaneous electrical nerve stimulation (TENS) helps relieve pain. Follow-up care is a key part of your treatment and safety.  Be sure to make and go to all appointments, and call your doctor if you are having problems. It's also a good idea to know your test results and keep a list of the medicines you take. Where can you learn more? Go to http://linda-haley.info/. Enter P130 in the search box to learn more about \"Learning About Peripheral Neuropathy. \"  Current as of: November 28, 2017  Content Version: 11.7  © 5477-9754 [employer]. Care instructions adapted under license by [employer] (which disclaims liability or warranty for this information). If you have questions about a medical condition or this instruction, always ask your healthcare professional. [place] [address] any warranty or liability for your use of this information. Neuropathic Pain: Care Instructions  Your Care Instructions    Neuropathic pain is caused by pressure on or damage to your nerves. It's often simply called nerve pain. Some people feel this type of pain all the time. For others, it comes and goes. Diabetes, shingles, or an injury can cause nerve pain. Many people say the pain feels sharp, burning, or stabbing. But some people feel it as a dull ache. In some cases, it makes your skin very sensitive. So touch, pressure, and other sensations that did not hurt before may now cause pain. It's important to know that this kind of pain is real and can affect your quality of life. It's also important to know that treatment can help. Treatment includes pain medicines, exercise, and physical therapy. Medicines can help reduce the number of pain signals that travel over the nerves. This can make the painful areas less sensitive. It can also help you sleep better and improve your mood. But medicines are only one part of successful treatment. Most people do best with more than one kind of treatment. Your doctor may recommend that you try cognitive-behavioral therapy and stress management.  Or, if needed, you may decide to try to quit smoking, lower your blood pressure, or better control blood sugar. These kinds of healthy changes can also make a difference. If you feel that your treatment is not working, talk to your doctor. And be sure to tell your doctor if you think you might be depressed or anxious. These are common problems that can also be treated. Follow-up care is a key part of your treatment and safety. Be sure to make and go to all appointments, and call your doctor if you are having problems. It's also a good idea to know your test results and keep a list of the medicines you take. How can you care for yourself at home? · Be safe with medicines. Read and follow all instructions on the label. ¨ If the doctor gave you a prescription medicine for pain, take it as prescribed. ¨ If you are not taking a prescription pain medicine, ask your doctor if you can take an over-the-counter medicine. · Save hard tasks for days when you have less pain. Follow a hard task with an easy task. And remember to take breaks. · Relax, and reduce stress. You may want to try deep breathing or meditation. These can help. · Keep moving. Gentle, daily exercise can help reduce pain. Your doctor or physical therapist can tell you what type of exercise is best for you. This may include walking, swimming, and stationary biking. It may also include stretches and range-of-motion exercises. · Try heat, cold packs, and massage. · Get enough sleep. Constant pain can make you more tired. If the pain makes it hard to sleep, talk with your doctor. · Think positively. Your thoughts can affect your pain. Do fun things to distract yourself from the pain. See a movie, read a book, listen to music, or spend time with a friend. · Keep a pain diary. Try to write down how strong your pain is and what it feels like. Also try to notice and write down how your moods, thoughts, sleep, activities, and medicine affect your pain.  These notes can help you and your doctor find the best ways to treat your pain. Reducing constipation caused by pain medicine  Pain medicines often cause constipation. To reduce constipation:  · Include fruits, vegetables, beans, and whole grains in your diet each day. These foods are high in fiber. · Drink plenty of fluids, enough so that your urine is light yellow or clear like water. If you have kidney, heart, or liver disease and have to limit fluids, talk with your doctor before you increase the amount of fluids you drink. · Get some exercise every day. Build up slowly to 30 to 60 minutes a day on 5 or more days of the week. · Take a fiber supplement, such as Citrucel or Metamucil, every day if needed. Read and follow all instructions on the label. · Schedule time each day for a bowel movement. Having a daily routine may help. Take your time and do not strain when having a bowel movement. · Ask your doctor about a laxative. The goal is to have one easy bowel movement every 1 to 2 days. Do not let constipation go untreated for more than 3 days. When should you call for help? Call your doctor now or seek immediate medical care if:    · You feel sad, anxious, or hopeless for more than a few days. This could mean you are depressed. Depression is common in people who have a lot of pain. But it can be treated.     · You have trouble with bowel movements, such as:  ¨ No bowel movement in 3 days. ¨ Blood in the anal area, in your stool, or on the toilet paper. ¨ Diarrhea for more than 24 hours.    Watch closely for changes in your health, and be sure to contact your doctor if:    · Your pain is getting worse.     · You can't sleep because of pain.     · You are very worried or anxious about your pain.     · You have trouble taking your pain medicine.     · You have any concerns about your pain medicine or its side effects.     · You have vomiting or cramps for more than 2 hours. Where can you learn more?   Go to http://linda-haley.info/. Enter Z077 in the search box to learn more about \"Neuropathic Pain: Care Instructions. \"  Current as of: October 9, 2017  Content Version: 11.7  © 3900-0753 [employer]. Care instructions adapted under license by [employer] (which disclaims liability or warranty for this information). If you have questions about a medical condition or this instruction, always ask your healthcare professional. [place] [address] any warranty or liability for your use of this information.

## 2019-05-01 ENCOUNTER — OFFICE VISIT (OUTPATIENT)
Dept: ORTHOPEDIC SURGERY | Age: 54
End: 2019-05-01

## 2019-05-01 VITALS
HEIGHT: 71 IN | WEIGHT: 222.4 LBS | DIASTOLIC BLOOD PRESSURE: 85 MMHG | BODY MASS INDEX: 31.14 KG/M2 | TEMPERATURE: 98.2 F | SYSTOLIC BLOOD PRESSURE: 143 MMHG | OXYGEN SATURATION: 96 % | RESPIRATION RATE: 16 BRPM | HEART RATE: 82 BPM

## 2019-05-01 DIAGNOSIS — M43.16 SPONDYLOLISTHESIS OF LUMBAR REGION: Primary | ICD-10-CM

## 2019-05-01 DIAGNOSIS — M54.50 NONSPECIFIC PAIN IN THE LUMBAR REGION: ICD-10-CM

## 2019-05-01 DIAGNOSIS — S39.012A STRAIN OF LUMBAR REGION, INITIAL ENCOUNTER: ICD-10-CM

## 2019-05-01 RX ORDER — METHYLPREDNISOLONE 4 MG/1
TABLET ORAL
Qty: 1 DOSE PACK | Refills: 0 | Status: SHIPPED | OUTPATIENT
Start: 2019-05-01 | End: 2019-11-17

## 2019-05-01 RX ORDER — BACLOFEN 10 MG/1
TABLET ORAL
Qty: 60 TAB | Refills: 0 | Status: SHIPPED | OUTPATIENT
Start: 2019-05-01 | End: 2019-11-17

## 2019-05-01 RX ORDER — KETOROLAC TROMETHAMINE 15 MG/ML
60 INJECTION, SOLUTION INTRAMUSCULAR; INTRAVENOUS ONCE
Qty: 1 VIAL | Refills: 0
Start: 2019-05-01 | End: 2019-05-01

## 2019-05-01 NOTE — PROGRESS NOTES
Verbal order entered per Dr. Kimberly Jackson as documented on blue sheet:MDP take as directed. Baclofen 10mg take 1 to 2 tabs po QHS prn pain. Disp 60 no refills. Toradol IM x 1 now, left gluteus.

## 2019-05-01 NOTE — PATIENT INSTRUCTIONS
Back Strain: Care Instructions  Overview    A back strain happens when you overstretch, or pull, a muscle in your back. You may hurt your back in an accident or when you exercise or lift something. Sometimes you may not know how you hurt your back. Most back pain will get better with rest and time. You can take care of yourself at home to help your back heal.  Follow-up care is a key part of your treatment and safety. Be sure to make and go to all appointments, and call your doctor if you are having problems. It's also a good idea to know your test results and keep a list of the medicines you take. How can you care for yourself at home? · Try to stay as active as you can, but stop or reduce any activity that causes pain. · Put ice or a cold pack on the sore muscle for 10 to 20 minutes at a time to stop swelling. Try this every 1 to 2 hours for 3 days (when you are awake) or until the swelling goes down. Put a thin cloth between the ice pack and your skin. · After 2 or 3 days, apply a heating pad on low or a warm cloth to your back. Some doctors suggest that you go back and forth between hot and cold treatments. · Take pain medicines exactly as directed. ? If the doctor gave you a prescription medicine for pain, take it as prescribed. ? If you are not taking a prescription pain medicine, ask your doctor if you can take an over-the-counter medicine. · Try sleeping on your side with a pillow between your legs. Or put a pillow under your knees when you lie on your back. These measures can ease pain in your lower back. · Return to your usual level of activity slowly. When should you call for help? Call 911 anytime you think you may need emergency care. For example, call if:    · You are unable to move a leg at all.   [facility] your doctor now or seek immediate medical care if:    · You have new or worse symptoms in your legs, belly, or buttocks.  Symptoms may include:  ? Numbness or tingling. ? Weakness. ? Pain.     · You lose bladder or bowel control.    Watch closely for changes in your health, and be sure to contact your doctor if:    · You have a fever, lose weight, or don't feel well.     · You are not getting better as expected. Where can you learn more? Go to http://linda-haley.info/. Enter X229 in the search box to learn more about \"Back Strain: Care Instructions. \"  Current as of: September 20, 2018  Content Version: 11.9  © 2906-8414 [employer]. Care instructions adapted under license by [employer] (which disclaims liability or warranty for this information). If you have questions about a medical condition or this instruction, always ask your healthcare professional. [address] any warranty or liability for your use of this information.

## 2019-05-01 NOTE — PROGRESS NOTES
[address]  [address], [address]  [place], [facility] [address]  Phone: [phone]  Fax: [phone]        Keith Vazquez  : 1965  PCP: Mikayla Gonzalez MD    PROGRESS NOTE      ASSESSMENT AND PLAN    Diagnoses and all orders for this visit:    1. Spondylolisthesis of lumbar region  -     methylPREDNISolone (MEDROL DOSEPACK) 4 mg tablet; Take as directed  -     baclofen (LIORESAL) 10 mg tablet; Take 1 to 2 tabs po QHS as needed for pain    2. Strain of lumbar region, initial encounter  -     KETOROLAC TROMETHAMINE INJ  -     ketorolac (TORADOL) 15 mg/mL soln injection; 4 mL by IntraMUSCular route once for 1 dose. -     MD THER/PROPH/DIAG INJECTION, SUBCUT/IM  -     methylPREDNISolone (MEDROL DOSEPACK) 4 mg tablet; Take as directed  -     baclofen (LIORESAL) 10 mg tablet; Take 1 to 2 tabs po QHS as needed for pain    3. Nonspecific pain in the lumbar region  -     AMB POC XRAY, SPINE, LUMBOSACRAL; 4+       1. Advised to continue HEP. 2. Rx for MDP. No Ibuprofen  3. Trial of Baclofen  4. DC Gabapentin  5. Toradol injection  6. Given information on back strain    F/U 2 weeks ok to add on      HISTORY OF PRESENT ILLNESS  Latanya Cortés is a 47 y.o. male. Pt last evaluated 2018 for lumbar stenosis. Pt states he was doing well and was active in the gym for a while. 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 He notes 3 days later he woke with pain and tried remedies that have failed, meds, heat. He states he has pain in L>R low back with bending and standing and walking. He c/o difficulty concentrating. 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 He c/o cramping in back of both hands and Ibuprofen helps better than Naprosyn.      Location of pain: low back  Does pain radiate into extremities: BLE numbness  Does patient have weakness: no   Pt denies saddle paresthesias. Medications pt is on: Ibuprofen 800mg TID. Gabapentin 300mg QHS minimal benefit, during day he becomes 'out of it'. Pt denies taking MDP since this began. Pt reports taking multiple vitamins and Enbrel. Inversion table minimal benefit. Pt denies recent ED visits or hospitalizations. Denies persistent fevers, chills, weight changes, neurogenic bowel or bladder symptoms. Pt denies any recent GI ulcers, bleeds or renal dysfucntion. Treatments patient has tried:  Physical therapy:Yes  Doing HEP: Yes, elliptical, weights, will work up to yoga  Non-opioid medications: Yes Failed Gabapentin  Spinal injections: No  Spinal surgery- No.   Last L MRI 2013: lateral recess stenosis L4-5     reviewed. He works at [employer] as an . PMHx of psoriasis, reflux, shingles, neuropathy. 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 Pain Assessment  5/1/2019   Location of Pain Back;Leg   Pain Location Comment -   Location Modifiers Right;Left   Severity of Pain 9   Quality of Pain Aching;Dull; Vivica Lecher; Throbbing   Quality of Pain Comment stabbing, numbness   Duration of Pain -   Frequency of Pain Constant   Aggravating Factors Standing;Walking   Aggravating Factors Comment -   Limiting Behavior -   Relieving Factors Nothing   Result of Injury -       PAST MEDICAL HISTORY   Past Medical History:   Diagnosis Date    Closed fracture of metacarpal bone(s), site unspecified     Esophageal reflux     in past, about 2010    Hyperlipidemia     Psoriasis        Past Surgical History:   Procedure Laterality Date    HX ORTHOPAEDIC      left hand   .       MEDICATIONS      Current Outpatient Medications   Medication Sig Dispense Refill    methylPREDNISolone (MEDROL DOSEPACK) 4 mg tablet Take as directed 1 Dose Pack 0    baclofen (LIORESAL) 10 mg tablet Take 1 to 2 tabs po QHS as needed for pain 60 Tab 0    gabapentin (NEURONTIN) 300 mg capsule Take 1 Cap by mouth nightly as needed.  Indications: NEUROPATHIC PAIN 30 Cap 2    ENBREL SURECLICK 50 mg/mL (1.96 mL) injection       atorvastatin (LIPITOR) 40 mg tablet       fenofibrate nanocrystallized (TRICOR) 48 mg tablet       AMNESTEEM 40 mg capsule       naproxen (NAPROSYN) 500 mg tablet Take 1 tab po BID with food 180 Tab 1    methylPREDNISolone (MEDROL DOSEPACK) 4 mg tablet Per dose pack instructions 1 Dose Pack 0        ALLERGIES  No Known Allergies       SOCIAL HISTORY    Social History     Socioeconomic History    Marital status:      Spouse name: Not on file    Number of children: Not on file    Years of education: Not on file    Highest education level: Not on file   Occupational History    Not on file   Social Needs    Financial resource strain: Not on file    Food insecurity:     Worry: Not on file     Inability: Not on file    Transportation needs:     Medical: Not on file     Non-medical: Not on file   Tobacco Use    Smoking status: Never Smoker    Smokeless tobacco: Never Used   Substance and Sexual Activity    Alcohol use: Not on file    Drug use: Not on file    Sexual activity: Not on file   Lifestyle    Physical activity:     Days per week: Not on file     Minutes per session: Not on file    Stress: Not on file   Relationships    Social connections:     Talks on phone: Not on file     Gets together: Not on file     Attends Muslim service: Not on file     Active member of club or organization: Not on file     Attends meetings of clubs or organizations: Not on file     Relationship status: Not on file    Intimate partner violence:     Fear of current or ex partner: Not on file     Emotionally abused: Not on file     Physically abused: Not on file     Forced sexual activity: Not on file   Other Topics Concern    Not on file   Social History Narrative    Not on file       FAMILY HISTORY    Family History   Problem Relation Age of Onset    High Cholesterol Other     Diabetes Other     Breast Cancer Other     Cancer Mother         breast    Diabetes Father         type 2    Hypertension Father        REVIEW OF SYSTEMS  Review of Systems   Constitutional: Negative for chills, fever and weight loss. Respiratory: Negative for shortness of breath. Cardiovascular: Negative for chest pain. Gastrointestinal: Negative for constipation. Negative for fecal incontinence   Genitourinary: Negative for dysuria. Negative for urinary incontinence   Musculoskeletal:        Per HPI   Skin: Negative for rash. Neurological: Positive for tingling. Negative for dizziness, tremors, focal weakness and headaches. Endo/Heme/Allergies: Does not bruise/bleed easily. Psychiatric/Behavioral: The patient does not have insomnia. PHYSICAL EXAMINATION  Visit Vitals  /85   Pulse 82   Temp 98.2 °F (36.8 °C)   Resp 16   Ht 5' 11\" (1.803 m)   Wt 222 lb 6.4 oz (100.9 kg)   SpO2 96%   BMI 31.02 kg/m²         Accompanied by self. Constitutional:  Well developed, well nourished, in no acute distress. Psychiatric: Affect and mood are appropriate. Integumentary: No rashes or abrasions noted on exposed areas. Cardiovascular/Peripheral Vascular: Intact l pulses. No peripheral edema is noted BLE. Lymphatic:  No evidence of lymphedema. No cervical lymphadenopathy. SPINE/MUSCULOSKELETAL EXAM      Lumbar spine:  No rash, ecchymosis, or gross obliquity. No fasciculations. No focal atrophy is noted. Pain with lumbar extension and L lateral bending. Tenderness to palpation L4-5. No tenderness to palpation at the sciatic notch. SI joints non-tender. Trochanters non tender. Sensation grossly intact to light touch. MOTOR:     Hip Flex Quads Hamstrings Ankle DF EHL Ankle PF   Right 5/5 5/5 5/5 5/5 5/5 5/5   Left 5/5 5/5 5/5 5/5 5/5 5/5        Straight Leg raise negative. No difficulty with tandem gait. Intact Heel walk. Intact Toe rise.   Squat intact. Ambulation without assistive device. FWB. RADIOGRAPHS  Lumbar spine xray films reviewed:  1) disc space narrowing L4-5  2) mild retrolisthesis L4-5    Written by Thor Harper, as dictated by Lulu Liao MD.    I, Dr. Lulu Liao MD, confirm that all documentation is accurate. Mr. Cynthia Gutierrez may have a reminder for a \"due or due soon\" health maintenance. I have asked that he contact his primary care provider for follow-up on this health maintenance.

## 2019-05-15 ENCOUNTER — OFFICE VISIT (OUTPATIENT)
Dept: ORTHOPEDIC SURGERY | Age: 54
End: 2019-05-15

## 2019-05-15 VITALS
BODY MASS INDEX: 30.57 KG/M2 | HEART RATE: 76 BPM | HEIGHT: 71 IN | WEIGHT: 218.4 LBS | TEMPERATURE: 98 F | OXYGEN SATURATION: 97 % | DIASTOLIC BLOOD PRESSURE: 94 MMHG | SYSTOLIC BLOOD PRESSURE: 133 MMHG | RESPIRATION RATE: 17 BRPM

## 2019-05-15 DIAGNOSIS — M51.36 DDD (DEGENERATIVE DISC DISEASE), LUMBAR: ICD-10-CM

## 2019-05-15 DIAGNOSIS — M48.061 STENOSIS OF LATERAL RECESS OF LUMBAR SPINE: Primary | ICD-10-CM

## 2019-05-15 NOTE — PROGRESS NOTES
Verbal order entered per Dr. Asaf Alvarenga as documented on blue sheet:MRI L-spine 1 month of increased LBP, difficulty standing/walking.  Failed steroids/NSAIDs

## 2019-05-15 NOTE — PATIENT INSTRUCTIONS
Back Care and Preventing Injuries: Care Instructions  Your Care Instructions    You can hurt your back doing many everyday activities: lifting a heavy box, bending down to garden, exercising at the gym, and even getting out of bed. But you can keep your back strong and healthy by doing some exercises. You also can follow a few tips for sitting, sleeping, and lifting to avoid hurting your back again. Talk to your doctor before you start an exercise program. Ask for help if you want to learn more about keeping your back healthy. Follow-up care is a key part of your treatment and safety. Be sure to make and go to all appointments, and call your doctor if you are having problems. It's also a good idea to know your test results and keep a list of the medicines you take. How can you care for yourself at home? · Stay at a healthy weight to avoid strain on your lower back. · Do not smoke. Smoking increases the risk of osteoporosis, which weakens the spine. If you need help quitting, talk to your doctor about stop-smoking programs and medicines. These can increase your chances of quitting for good. · Make sure you sleep in a position that maintains your back's normal curves and on a mattress that feels comfortable. Sleep on your side with a pillow between your knees, or sleep on your back with a pillow under your knees. These positions can reduce strain on your back. · When you get out of bed, lie on your side and bend both knees. Drop your feet over the edge of the bed as you push up with both arms. Scoot to the edge of the bed. Make sure your feet are in line with your rear end (buttocks), and then stand up. · If you must stand for a long time, put one foot on a stool, ledge, or box. Exercise to strengthen your back and other muscles  · Get at least 30 minutes of exercise on most days of the week. Walking is a good choice.  You also may want to do other activities, such as running, swimming, cycling, or playing tennis or team sports. · Stretch your back muscles. Here are few exercises to try:  ? Lie on your back with your knees bent and your feet flat on the floor. Gently pull one bent knee to your chest. Put that foot back on the floor, and then pull the other knee to your chest. Hold for 15 to 30 seconds. Repeat 2 to 4 times. ? Do pelvic tilts. Lie on your back with your knees bent. Tighten your stomach muscles. Pull your belly button (navel) in and up toward your ribs. You should feel like your back is pressing to the floor and your hips and pelvis are slightly lifting off the floor. Hold for 6 seconds while breathing smoothly. · Keep your core muscles strong. The muscles of your back, belly (abdomen), and buttocks support your spine. ? Pull in your belly, and imagine pulling your navel toward your spine. Hold this for 6 seconds, then relax. Remember to keep breathing normally as you tense your muscles. ? Do curl-ups. Always do them with your knees bent. Keep your low back on the floor, and curl your shoulders toward your knees using a smooth, slow motion. Keep your arms folded across your chest. If this bothers your neck, try putting your hands behind your neck (not your head), with your elbows spread apart. ? Lie on your back with your knees bent and your feet flat on the floor. Tighten your belly muscles, and then push with your feet and raise your buttocks up a few inches. Hold this position 6 seconds as you continue to breathe normally, then lower yourself slowly to the floor. Repeat 8 to 12 times. ? If you like group exercise, try Pilates or yoga. These classes have poses that strengthen the core muscles. Protect your back when you sit  · Place a small pillow, a rolled-up towel, or a lumbar roll in the curve of your back if you need extra support. · Sit in a chair that is low enough to let you place both feet flat on the floor with both knees nearly level with your hips.  If your chair or desk is too high, use a foot rest to raise your knees. · When driving, keep your knees nearly level with your hips. Sit straight, and drive with both hands on the steering wheel. Your arms should be in a slightly bent position. · Try a kneeling chair, which helps tilt your hips forward. This takes pressure off your lower back. · Try sitting on an exercise ball. It can rock from side to side, which helps keep your back loose. Lift properly  · Squat down, bending at the hips and knees only. If you need to, put one knee to the floor and extend your other knee in front of you, bent at a right angle (half kneeling). · Press your chest straight forward. This helps keep your upper back straight while keeping a slight arch in your low back. · Hold the load as close to your body as possible, at the level of your navel. · Use your feet to change direction, taking small steps. · Lead with your hips as you change direction. Keep your shoulders in line with your hips as you move. Do not twist your body. · Set down your load carefully, squatting with your knees and hips only. When should you call for help? Watch closely for changes in your health, and be sure to contact your doctor if you have any problems. Where can you learn more? Go to http://linda-haley.info/. Enter S810 in the search box to learn more about \"Back Care and Preventing Injuries: Care Instructions. \"  Current as of: September 20, 2018  Content Version: 11.9  © 8005-3837 [employer]. Care instructions adapted under license by [employer] (which disclaims liability or warranty for this information). If you have questions about a medical condition or this instruction, always ask your healthcare professional. [place] [address] any warranty or liability for your use of this information.          Low Back Pain: Exercises  Your Care Instructions  Here are some examples of typical rehabilitation exercises for your condition. Start each exercise slowly. Ease off the exercise if you start to have pain. Your doctor or physical therapist will tell you when you can start these exercises and which ones will work best for you. How to do the exercises  Press-up    1. Lie on your stomach, supporting your body with your forearms. 2. Press your elbows down into the floor to raise your upper back. As you do this, relax your stomach muscles and allow your back to arch without using your back muscles. As your press up, do not let your hips or pelvis come off the floor. 3. Hold for 15 to 30 seconds, then relax. 4. Repeat 2 to 4 times. Alternate arm and leg (bird dog) exercise    1. Start on the floor, on your hands and knees. 2. Tighten your belly muscles. 3. Raise one leg off the floor, and hold it straight out behind you. Be careful not to let your hip drop down, because that will twist your trunk. 4. Hold for about 6 seconds, then lower your leg and switch to the other leg. 5. Repeat 8 to 12 times on each leg. 6. Over time, work up to holding for 10 to 30 seconds each time. 7. If you feel stable and secure with your leg raised, try raising the opposite arm straight out in front of you at the same time. Knee-to-chest exercise    1. Lie on your back with your knees bent and your feet flat on the floor. 2. Bring one knee to your chest, keeping the other foot flat on the floor (or keeping the other leg straight, whichever feels better on your lower back). 3. Keep your lower back pressed to the floor. Hold for at least 15 to 30 seconds. 4. Relax, and lower the knee to the starting position. 5. Repeat with the other leg. Repeat 2 to 4 times with each leg. 6. To get more stretch, put your other leg flat on the floor while pulling your knee to your chest.    Curl-ups    1. Lie on the floor on your back with your knees bent at a 90-degree angle.  Your feet should be flat on the floor, about 12 inches from your buttocks. 2. Cross your arms over your chest. If this bothers your neck, try putting your hands behind your neck (not your head), with your elbows spread apart. 3. Slowly tighten your belly muscles and raise your shoulder blades off the floor. 4. Keep your head in line with your body, and do not press your chin to your chest.  5. Hold this position for 1 or 2 seconds, then slowly lower yourself back down to the floor. 6. Repeat 8 to 12 times. Pelvic tilt exercise    1. Lie on your back with your knees bent. 2. \"Brace\" your stomach. This means to tighten your muscles by pulling in and imagining your belly button moving toward your spine. You should feel like your back is pressing to the floor and your hips and pelvis are rocking back. 3. Hold for about 6 seconds while you breathe smoothly. 4. Repeat 8 to 12 times. Heel dig bridging    1. Lie on your back with both knees bent and your ankles bent so that only your heels are digging into the floor. Your knees should be bent about 90 degrees. 2. Then push your heels into the floor, squeeze your buttocks, and lift your hips off the floor until your shoulders, hips, and knees are all in a straight line. 3. Hold for about 6 seconds as you continue to breathe normally, and then slowly lower your hips back down to the floor and rest for up to 10 seconds. 4. Do 8 to 12 repetitions. Hamstring stretch in doorway    1. Lie on your back in a doorway, with one leg through the open door. 2. Slide your leg up the wall to straighten your knee. You should feel a gentle stretch down the back of your leg. 3. Hold the stretch for at least 15 to 30 seconds. Do not arch your back, point your toes, or bend either knee. Keep one heel touching the floor and the other heel touching the wall. 4. Repeat with your other leg. 5. Do 2 to 4 times for each leg. Hip flexor stretch    1. Kneel on the floor with one knee bent and one leg behind you.  Place your forward knee over your foot. Keep your other knee touching the floor. 2. Slowly push your hips forward until you feel a stretch in the upper thigh of your rear leg. 3. Hold the stretch for at least 15 to 30 seconds. Repeat with your other leg. 4. Do 2 to 4 times on each side. Wall sit    1. Stand with your back 10 to 12 inches away from a wall. 2. Lean into the wall until your back is flat against it. 3. Slowly slide down until your knees are slightly bent, pressing your lower back into the wall. 4. Hold for about 6 seconds, then slide back up the wall. 5. Repeat 8 to 12 times. Follow-up care is a key part of your treatment and safety. Be sure to make and go to all appointments, and call your doctor if you are having problems. It's also a good idea to know your test results and keep a list of the medicines you take. Where can you learn more? Go to http://linda-haley.info/. Enter U771 in the search box to learn more about \"Low Back Pain: Exercises. \"  Current as of: September 20, 2018  Content Version: 11.9  © 1844-0884 [employer]. Care instructions adapted under license by [employer] (which disclaims liability or warranty for this information). If you have questions about a medical condition or this instruction, always ask your healthcare professional. [address] any warranty or liability for your use of this information.

## 2019-05-15 NOTE — PROGRESS NOTES
[facility] [address]  [address], [address]  [place]  Phone: [phone]  Fax: [phone]        Odell Serrano  : 1965  PCP: Keri Gipson MD    PROGRESS NOTE      ASSESSMENT AND PLAN    Diagnoses and all orders for this visit:    1. Stenosis of lateral recess of lumbar spine  -     MRI LUMB SPINE WO CONT; Future    2. DDD (degenerative disc disease), lumbar  -     MRI LUMB SPINE WO CONT; Future       1. 46yo active male with ongoing symptoms 1 month post injury. Has been unable to return to gym. Advised to continue HEP. 2. MRI lumbar spine - 4 weeks low back pain, difficulty standing and walking. Failed NSAIDS, steroids. 3. May take resume Ibuprofen/Tylenol PRN  4. Given information on preventing injury and low back exercises    F/U after MRI      HISTORY OF PRESENT ILLNESS  Hallie Pak is a 47 y.o. male. Pt presents to the office for a f/u visit for low back pain. Last OV given MPD, trial of Baclofen, Toradol injection. He states mornings are bad and he is stiff. He has L>R shooting electric pains at night in his back into his buttock. He states he is unable to remain asleep at night, so he wakes groggy. Pt states he is afraid to return to the gym. 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 He reports 20-30 minute standing tolerance. He reports stiffness in his hips at times. Location of pain: low back, between shoulder blades  Does pain radiate into extremities: L hip  Does patient have weakness: no   Pt denies saddle paresthesias. Medications pt is on: MDP with benefit and 'spacey' and somnolent side effect. Baclofen 10mg 1-2 tab PRN QHS. Tylenol PRN. Enbrel for psoriasis. Pt denies recent ED visits or hospitalizations. Denies persistent fevers, chills, weight changes, neurogenic bowel or bladder symptoms.       Treatments patient has tried:  Physical therapy:Yes  Doing HEP: Yes, elliptical, weights, will work up to yoga  Non-opioid medications: Yes Failed Gabapentin  Spinal injections: No  Spinal surgery- No.   Last L MRI 2013: lateral recess stenosis L4-5  EMG 2018: diffuse sensory neuropathy BLE     reviewed. He works at [employer] as an . 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 PMHx of psoriasis, reflux, shingles, neuropathy.     Pain Assessment  5/15/2019   Location of Pain Back;Leg   Pain Location Comment -   Location Modifiers Right;Left   Severity of Pain 4   Quality of Pain Dull;Aching; Merwyn Angel; Throbbing;Burning; Other (Comment)   Quality of Pain Comment Stabbing, stiffness, numbness    Duration of Pain Persistent   Frequency of Pain Constant   Aggravating Factors Standing;Bending   Aggravating Factors Comment -   Limiting Behavior Yes   Relieving Factors Heat;Ice   Result of Injury No       PAST MEDICAL HISTORY   Past Medical History:   Diagnosis Date    Closed fracture of metacarpal bone(s), site unspecified     Esophageal reflux     in past, about 2010    Hyperlipidemia     Psoriasis        Past Surgical History:   Procedure Laterality Date    HX ORTHOPAEDIC      left hand   .       MEDICATIONS      Current Outpatient Medications   Medication Sig Dispense Refill    baclofen (LIORESAL) 10 mg tablet Take 1 to 2 tabs po QHS as needed for pain 60 Tab 0    ENBREL SURECLICK 50 mg/mL (6.40 mL) injection       atorvastatin (LIPITOR) 40 mg tablet       fenofibrate nanocrystallized (TRICOR) 48 mg tablet       methylPREDNISolone (MEDROL DOSEPACK) 4 mg tablet Take as directed 1 Dose Pack 0    AMNESTEEM 40 mg capsule           ALLERGIES  No Known Allergies       SOCIAL HISTORY    Social History     Socioeconomic History    Marital status:      Spouse name: Not on file    Number of children: Not on file    Years of education: Not on file    Highest education level: Not on file   Occupational History    Not on file   Social Needs    Financial resource strain: Not on file    Food insecurity:     Worry: Not on file     Inability: Not on file    Transportation needs:     Medical: Not on file     Non-medical: Not on file   Tobacco Use    Smoking status: Never Smoker    Smokeless tobacco: Never Used   Substance and Sexual Activity    Alcohol use: Not on file    Drug use: Not on file    Sexual activity: Not on file   Lifestyle    Physical activity:     Days per week: Not on file     Minutes per session: Not on file    Stress: Not on file   Relationships    Social connections:     Talks on phone: Not on file     Gets together: Not on file     Attends Buddhism service: Not on file     Active member of club or organization: Not on file     Attends meetings of clubs or organizations: Not on file     Relationship status: Not on file    Intimate partner violence:     Fear of current or ex partner: Not on file     Emotionally abused: Not on file     Physically abused: Not on file     Forced sexual activity: Not on file   Other Topics Concern    Not on file   Social History Narrative    Not on file       FAMILY HISTORY    Family History   Problem Relation Age of Onset    High Cholesterol Other     Diabetes Other     Breast Cancer Other     Cancer Mother         breast    Diabetes Father         type 2    Hypertension Father        REVIEW OF SYSTEMS  Review of Systems   Constitutional: Negative for chills, fever and weight loss. Respiratory: Negative for shortness of breath. Cardiovascular: Negative for chest pain. Gastrointestinal: Negative for constipation. Negative for fecal incontinence   Genitourinary: Negative for dysuria. Negative for urinary incontinence   Musculoskeletal:        Per HPI   Skin: Negative for rash.    Neurological: Negative for dizziness, tingling, tremors, focal weakness and headaches. Endo/Heme/Allergies: Does not bruise/bleed easily. Psychiatric/Behavioral: The patient does not have insomnia. PHYSICAL EXAMINATION  Visit Vitals  BP (!) 133/94   Pulse 76   Temp 98 °F (36.7 °C)   Resp 17   Ht 5' 11\" (1.803 m)   Wt 218 lb 6.4 oz (99.1 kg)   SpO2 97%   BMI 30.46 kg/m²         Accompanied by self. Constitutional:  Well developed, well nourished, in no acute distress. Psychiatric: Affect and mood are appropriate. Integumentary: No rashes or abrasions noted on exposed areas. Cardiovascular/Peripheral Vascular: Intact l pulses. No peripheral edema is noted BLE. Lymphatic:  No evidence of lymphedema. No cervical lymphadenopathy. SPINE/MUSCULOSKELETAL EXAM      Lumbar spine:  No rash, ecchymosis, or gross obliquity. No fasciculations. No focal atrophy is noted. Stiffness with R lateral bending. Tenderness to palpation L4/5/S1 . No tenderness to palpation at the sciatic notch. SI joints non-tender. Trochanters non tender. No DP. Guarded. MOTOR:       Hip Flex  Quads Hamstrings Ankle DF EHL Ankle PF   Right +4/5 +4/5 +4/5 +4/5 +4/5 +4/5   Left +4/5 +4/5 +4/5 +4/5 +4/5 +4/5       Straight Leg raise negative. No difficulty with tandem gait. Ambulation without assistive device. FWB. Written by Moni Mora, as dictated by Janeth Dyer MD.    I, Dr. Janeth Dyer MD, confirm that all documentation is accurate. Mr. Shay Lopez may have a reminder for a \"due or due soon\" health maintenance. I have asked that he contact his primary care provider for follow-up on this health maintenance.

## 2019-05-22 ENCOUNTER — HOSPITAL ENCOUNTER (OUTPATIENT)
Age: 54
Discharge: HOME | End: 2019-05-22
Attending: PHYSICAL MEDICINE & REHABILITATION
Payer: COMMERCIAL

## 2019-05-22 DIAGNOSIS — M51.36 DDD (DEGENERATIVE DISC DISEASE), LUMBAR: ICD-10-CM

## 2019-05-22 DIAGNOSIS — M48.061 STENOSIS OF LATERAL RECESS OF LUMBAR SPINE: ICD-10-CM

## 2019-05-22 PROCEDURE — 72148 MRI LUMBAR SPINE W/O DYE: CPT

## 2019-06-20 ENCOUNTER — OFFICE VISIT (OUTPATIENT)
Dept: ORTHOPEDIC SURGERY | Age: 54
End: 2019-06-20

## 2019-06-20 VITALS
HEART RATE: 77 BPM | HEIGHT: 71 IN | SYSTOLIC BLOOD PRESSURE: 135 MMHG | RESPIRATION RATE: 19 BRPM | TEMPERATURE: 97.8 F | BODY MASS INDEX: 30.97 KG/M2 | WEIGHT: 221.2 LBS | DIASTOLIC BLOOD PRESSURE: 87 MMHG

## 2019-06-20 DIAGNOSIS — M48.061 STENOSIS OF LATERAL RECESS OF LUMBAR SPINE: Primary | ICD-10-CM

## 2019-06-20 DIAGNOSIS — M47.816 LUMBAR FACET ARTHROPATHY: ICD-10-CM

## 2019-06-20 DIAGNOSIS — M51.36 DDD (DEGENERATIVE DISC DISEASE), LUMBAR: ICD-10-CM

## 2019-06-20 RX ORDER — MELOXICAM 15 MG/1
TABLET ORAL
Qty: 30 TAB | Refills: 2 | Status: SHIPPED | OUTPATIENT
Start: 2019-06-20 | End: 2019-11-17

## 2019-06-20 NOTE — PROGRESS NOTES
[address]  [address], [address]  [place] [address]  Phone: [phone]  Fax: [phone]        Cristopher Fernández  : 1965  PCP: Beatriz Balbuena MD    PROGRESS NOTE      ASSESSMENT AND PLAN    Diagnoses and all orders for this visit:    1. Stenosis of lateral recess of lumbar spine    2. DDD (degenerative disc disease), lumbar    3. Lumbar facet arthropathy    Other orders  -     meloxicam (MOBIC) 15 mg tablet; Take 1 tab PO every day with food PRN for pain       1. Advised to continue HEP. Return to gym, emphasize core exercises. Exercises bike is ok. No dead lifts or high weight leg press. Recommend Lats, pecs, quad sets, hamstring curls, lunges. 2. Discussed MDP, spinal injections, TPI as treatment options  3. Advised to avoid processed foods; rather eat fresh fruits, vegetables and meats to reduce inflammation   4. Trial of Mobic. Do not take Ibuprofen. 5. No indication for surgery at this time. 6. PCP may fill refills if agreeable  7. Given information on arthritis and spondylolisthesis exercises    F/U 3 month PRN      HISTORY OF PRESENT ILLNESS  Saran Amato is a 47 y.o. male. Last visit pt was sent to have a lumbar spine MRI. Images reviewed with the pt. 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 He notes his shooting electric pains have eased up recently. Pt affirms he remains active, but he has not been doing gym exercises. He reports stiffness in his hips at times. He c/o L sided low back and hip pain as his primary pain. Location of pain: low back, between shoulder blades  Does pain radiate into extremities: L hip. Does patient have weakness: no   Pt denies saddle paresthesias. Medications pt is on: Baclofen 10mg 1-2 tab PRN QHS with 'loopiness'. Ibuprofen PRN. Enbrel for psoriasis. Tumeric. Pt denies recent ED visits or hospitalizations.  Denies persistent fevers, chills, weight changes, neurogenic bowel or bladder symptoms. Pt denies any recent fevers, chills, antibiotics, recent cortisone injections, or infections. Pt denies any recent GI ulcers, bleeds or renal dysfucntion. Treatments patient has tried:  Physical therapy:Yes  Doing HEP: Paused elliptical, weights, will work up to yoga due to lack of confidence  Non-opioid medications: Yes Failed Gabapentin. MDP- 'spacey' and somnolence  Spinal injections: No  Spinal surgery- No.   Last L MRI 2019: degenerative changes L4-5, unchanged from last study. FA at L4-5 with inflammation noted. EMG 2018: diffuse sensory neuropathy BLE      reviewed. He works at [employer] as an . 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 PMHx of psoriasis, reflux, shingles, neuropathy.     Pain Assessment  6/20/2019   Location of Pain Back;Leg   Pain Location Comment -   Location Modifiers Right;Left   Severity of Pain 3   Quality of Pain Aching   Quality of Pain Comment numbness   Duration of Pain -   Frequency of Pain Constant   Aggravating Factors Standing   Aggravating Factors Comment sitting long periods   Limiting Behavior -   Relieving Factors Ice;Heat   Result of Injury -         MRI Results (most recent):  Results from Hospital Encounter encounter on 05/22/19   MRI LUMB SPINE WO CONT    Narrative EXAM: MRI LUMB SPINE WO CONT    CLINICAL INDICATION/HISTORY: 47 years Male. 1 month of increased LBP, difficulty  standing/walking. failed steroid/NSAIDs. Additional History: None    COMPARISON: MRI lumbar spine 3/23/2018    TECHNIQUE: Multiplanar multi-sequential imaging of the lumbar spine without  contrast.     FINDINGS:    5 lumbar type vertebral bodies are assumed for the purposes of this dictation.   The disc space at axial T2 image 10 will be referred to as L5/S1 for the  purposes of this dictation. There is disc desiccation at L4/L5 and L5/S1. There is unchanged grade 1 retrolisthesis of L4 on L5 and L5 on S1. No  additional spondylolisthesis. Vertebral body heights are preserved. Redemonstrated 13 mm T1 hyperintense lesion in the L1 vertebral body, to T1  hyperintense lesions within the L4 vertebral body measuring up to 11 mm, and a  subcentimeter T1 hyperintense lesion in the S1 vertebral body, compatible with  intraosseous hemangiomas. No suspicious marrow replacing lesion. No evidence of  diffuse marrow infiltrative process. No evidence of acute fracture. The conus medullaris terminates at L1. Mild intramuscular edema is present within the lower lumbar paraspinal  musculature. There is a 4.8 cm T2 hyperintense exophytic lesion at the lower pole of the  right kidney, statistically most likely a cyst. Recommend confirmation of cystic  nature with ultrasound if not previously performed. Limited assessment of the  visualized soft tissues is otherwise unremarkable. Seen on sagittal images only:  L1-L2: No significant disc pathology. No significant spinal canal or neural  foraminal stenosis. Correlation of axial and sagittal images:    L2-3: No significant disc pathology. No significant spinal canal or neural  foraminal stenosis. L3-4: No significant disc pathology. No significant spinal canal or neural  foraminal stenosis. L4-5: Disc desiccation. Retrolisthesis. Moderate-sized bilobed annular bulge  Left foraminal zone annular fissure moderate hypertrophic bilateral facet  arthrosis with synovitis. Mild narrowing of bilateral lateral recesses. Moderate  bilateral foraminal narrowing. Suspected impingement of the exiting left L4  nerve root (series 6 image 17 and 18). Similar to prior. L5-S1: Trace retrolisthesis. Small asymmetrical annular bulge. Mild bilateral  facet arthrosis.  Effacement of ventral thecal sac without significant spinal  canal stenosis. Mild to moderate bilateral foraminal stenosis. No evidence of  nerve root impingement. Similar to prior. Impression 1. Mild degenerative changes at L4/L5 and L5/S1 without significant interval  change since compared to 3/23/2018.  -At L4/L5 there is suspected impingement of the exiting left L4 nerve root. -Moderate bilateral L4/L5 and mild to moderate bilateral L5/S1 foraminal  narrowing.  -Please see above for additional details. 2. A 4.8 cm likely right renal cyst, incompletely characterized without  contrast. Recommend confirmation of cystic nature with ultrasound, if not  performed previously. Thank you for enabling us to participate in the care of this patient. PAST MEDICAL HISTORY   Past Medical History:   Diagnosis Date    Closed fracture of metacarpal bone(s), site unspecified     Esophageal reflux     in past, about 2010    Hyperlipidemia     Psoriasis        Past Surgical History:   Procedure Laterality Date    HX ORTHOPAEDIC      left hand   .       MEDICATIONS      Current Outpatient Medications   Medication Sig Dispense Refill    meloxicam (MOBIC) 15 mg tablet Take 1 tab PO every day with food PRN for pain 30 Tab 2    baclofen (LIORESAL) 10 mg tablet Take 1 to 2 tabs po QHS as needed for pain 60 Tab 0    ENBREL SURECLICK 50 mg/mL (0.58 mL) injection       AMNESTEEM 40 mg capsule       atorvastatin (LIPITOR) 40 mg tablet       fenofibrate nanocrystallized (TRICOR) 48 mg tablet       methylPREDNISolone (MEDROL DOSEPACK) 4 mg tablet Take as directed 1 Dose Pack 0        ALLERGIES  No Known Allergies       SOCIAL HISTORY    Social History     Socioeconomic History    Marital status:      Spouse name: Not on file    Number of children: Not on file    Years of education: Not on file    Highest education level: Not on file   Occupational History    Not on file   Social Needs    Financial resource strain: Not on file    Food insecurity: Worry: Not on file     Inability: Not on file    Transportation needs:     Medical: Not on file     Non-medical: Not on file   Tobacco Use    Smoking status: Never Smoker    Smokeless tobacco: Never Used   Substance and Sexual Activity    Alcohol use: Not on file    Drug use: Not on file    Sexual activity: Not on file   Lifestyle    Physical activity:     Days per week: Not on file     Minutes per session: Not on file    Stress: Not on file   Relationships    Social connections:     Talks on phone: Not on file     Gets together: Not on file     Attends Adventism service: Not on file     Active member of club or organization: Not on file     Attends meetings of clubs or organizations: Not on file     Relationship status: Not on file    Intimate partner violence:     Fear of current or ex partner: Not on file     Emotionally abused: Not on file     Physically abused: Not on file     Forced sexual activity: Not on file   Other Topics Concern    Not on file   Social History Narrative    Not on file       FAMILY HISTORY    Family History   Problem Relation Age of Onset    High Cholesterol Other     Diabetes Other     Breast Cancer Other     Cancer Mother         breast    Diabetes Father         type 2    Hypertension Father        REVIEW OF SYSTEMS  Review of Systems   Constitutional: Negative for chills, fever and weight loss. Respiratory: Negative for shortness of breath. Cardiovascular: Negative for chest pain. Gastrointestinal: Negative for constipation. Negative for fecal incontinence   Genitourinary: Negative for dysuria. Negative for urinary incontinence   Musculoskeletal:        Per HPI   Skin: Negative for rash. Neurological: Positive for tingling. Negative for dizziness, tremors, focal weakness and headaches. Endo/Heme/Allergies: Does not bruise/bleed easily. Psychiatric/Behavioral: The patient does not have insomnia.         PHYSICAL EXAMINATION  Visit Vitals  BP 135/87 (BP 1 Location: Left arm, BP Patient Position: Sitting)   Pulse 77   Temp 97.8 °F (36.6 °C) (Oral)   Resp 19   Ht 5' 11\" (1.803 m)   Wt 221 lb 3.2 oz (100.3 kg)   BMI 30.85 kg/m²         Accompanied by self. Constitutional:  Well developed, well nourished, in no acute distress. Psychiatric: Affect and mood are appropriate. Integumentary: No rashes or abrasions noted on exposed areas. Cardiovascular/Peripheral Vascular: Intact l pulses. No peripheral edema is noted BLE. Lymphatic:  No evidence of lymphedema. No cervical lymphadenopathy. SPINE/MUSCULOSKELETAL EXAM    Lumbar spine:  No rash, ecchymosis, or gross obliquity. No fasciculations. No focal atrophy is noted. Good ROM at lumbar spine. Tenderness to palpation L4-5. No tenderness to palpation at the sciatic notch. SI joints non-tender. Trochanters non tender. MOTOR:       Hip Flex  Quads Hamstrings Ankle DF EHL Ankle PF   Right +4/5 +4/5 +4/5 +4/5 +4/5 +4/5   Left +4/5 +4/5 +4/5 +4/5 +4/5 +4/5     Straight Leg raise negative. Ambulation without assistive device. FWB. Written by Tin Hansen, as dictated by Scooter Mehta MD.    I, Dr. Scooter Mehta MD, confirm that all documentation is accurate. Mr. Neal May may have a reminder for a \"due or due soon\" health maintenance. I have asked that he contact his primary care provider for follow-up on this health maintenance.

## 2019-06-20 NOTE — PATIENT INSTRUCTIONS
Arthritis: Care Instructions Your Care Instructions Arthritis, also called osteoarthritis, is a breakdown of the cartilage that cushions your joints. When the cartilage wears down, your bones rub against each other. This causes pain and stiffness. Many people have some arthritis as they age. Arthritis most often affects the joints of the spine, hands, hips, knees, or feet. You can take simple measures to protect your joints, ease your pain, and help you stay active. Follow-up care is a key part of your treatment and safety. Be sure to make and go to all appointments, and call your doctor if you are having problems. It's also a good idea to know your test results and keep a list of the medicines you take. How can you care for yourself at home? · Stay at a healthy weight. Being overweight puts extra strain on your joints. · Talk to your doctor or physical therapist about exercises that will help ease joint pain. ? Stretch. You may enjoy gentle forms of yoga to help keep your joints and muscles flexible. ? Walk instead of jog. Other types of exercise that are less stressful on the joints include riding a bicycle, swimming, claudy chi, or water exercise. ? Lift weights. Strong muscles help reduce stress on your joints. Stronger thigh muscles, for example, take some of the stress off of the knees and hips. Learn the right way to lift weights so you do not make joint pain worse. · Take your medicines exactly as prescribed. Call your doctor if you think you are having a problem with your medicine. · Take pain medicines exactly as directed. ? If the doctor gave you a prescription medicine for pain, take it as prescribed. ? If you are not taking a prescription pain medicine, ask your doctor if you can take an over-the-counter medicine. · Use a cane, crutch, walker, or another device if you need help to get around. These can help rest your joints.  You also can use other things to make life easier, such as a higher toilet seat and padded handles on kitchen utensils. · Do not sit in low chairs, which can make it hard to get up. · Put heat or cold on your sore joints as needed. Use whichever helps you most. You also can take turns with hot and cold packs. ? Apply heat 2 or 3 times a day for 20 to 30 minutesusing a heating pad, hot shower, or hot packto relieve pain and stiffness. ? Put ice or a cold pack on your sore joint for 10 to 20 minutes at a time. Put a thin cloth between the ice and your skin. When should you call for help? Call your doctor now or seek immediate medical care if: 
  · You have sudden swelling, warmth, or pain in any joint.  
  · You have joint pain and a fever or rash.  
  · You have such bad pain that you cannot use a joint.  
 Watch closely for changes in your health, and be sure to contact your doctor if: 
  · You have mild joint symptoms that continue even with more than 6 weeks of care at home.  
  · You have stomach pain or other problems with your medicine. Where can you learn more? Go to http://linda-haley.info/. Enter H373 in the search box to learn more about \"Arthritis: Care Instructions. \" Current as of: Zandra 10, 2018 Content Version: 11.9 © 2039-9530 [employer]. Care instructions adapted under license by [employer] (which disclaims liability or warranty for this information). If you have questions about a medical condition or this instruction, always ask your healthcare professional. [place] [address] any warranty or liability for your use of this information. Spondylolysis and Spondylolisthesis: Exercises Your Care Instructions Here are some examples of typical rehabilitation exercises for your condition. Start each exercise slowly. Ease off the exercise if you start to have pain.  
Your doctor or physical therapist will tell you when you can start these exercises and which ones will work best for you. How to do the exercises Single knee-to-chest 
 
1. Lie on your back with your knees bent and your feet flat on the floor. You can put a small pillow under your head and neck if it is more comfortable. 2. Bring one knee to your chest, keeping the other foot flat on the floor. 3. Keep your lower back pressed to the floor. Hold for 15 to 30 seconds. 4. Relax, and lower the knee to the starting position. 5. Repeat with the other leg. Repeat 2 to 4 times with each leg. 6. To get more stretch, put your other leg flat on the floor while pulling your knee to your chest. 
 
Double knee-to-chest 
 
1. Lie on your back with your knees bent and your feet flat on the floor. You can put a small pillow under your head and neck if it is more comfortable. 2. Bring both knees to your chest. 
3. Keep your lower back pressed to the floor. Hold for 15 to 30 seconds. 4. Relax, and lower your knees to the starting position. 5. Repeat 2 to 4 times. Alternate arm and leg (bird dog) exercise 1. Start on the floor, on your hands and knees. 2. Tighten your belly muscles by pulling your belly button in toward your spine. Be sure you continue to breathe normally and do not hold your breath. 3. Raise one arm off the floor, and hold it straight out in front of you. Be careful not to let your shoulder drop down, because that will twist your trunk. 4. Hold for about 6 seconds, then lower your arm and switch to your other arm. 5. Repeat 8 to 12 times on each arm. 6. When you can do this exercise with ease and no pain, repeat steps 1 through 5. But this time do it with one leg raised off the floor, holding your leg straight out behind you. Be careful not to let your hip drop down, because that will twist your trunk. 7. When holding your leg straight out becomes easier, try raising your opposite arm at the same time, and repeat steps 1 through 5. Bridging 1. Lie on your back with both knees bent. Your knees should be bent about 90 degrees. 2. Then push your feet into the floor, squeeze your buttocks, and lift your hips off the floor until your shoulders, hips, and knees are all in a straight line. 3. Hold for about 6 seconds as you continue to breathe normally, and then slowly lower your hips back down to the floor and rest for up to 10 seconds. 4. Repeat 8 to 12 times. Curl-ups 1. Lie on the floor on your back with your knees bent at a 90-degree angle. Your feet should be flat on the floor, about 12 inches from your buttocks. 2. Cross your arms over your chest. If this bothers your neck, try putting your hands behind your neck (not your head), with your elbows spread apart. 3. Slowly tighten your belly muscles and raise your shoulder blades off the floor. 4. Keep your head in line with your body, and do not press your chin to your chest. 
5. Hold this position for 1 or 2 seconds, then slowly lower yourself back down to the floor. 6. Repeat 8 to 12 times. Shanell Haider 1. Lie on your stomach, resting your upper body on your forearms. 2. Tighten your belly muscles by pulling your belly button in toward your spine. 3. Keeping your knees on the floor, press down with your forearms to lift your upper body off the floor. 4. Hold for about 6 seconds, then lower your body to the floor. Rest for up to 10 seconds. 5. Repeat 8 to 12 times. 6. Over time, work up to holding for 15 to 30 seconds each time. 7. If this exercise is easy to do with your knees on the floor, try doing this exercise with your knees and legs straight, supported by your toes on the floor. Follow-up care is a key part of your treatment and safety. Be sure to make and go to all appointments, and call your doctor if you are having problems. It's also a good idea to know your test results and keep a list of the medicines you take. Where can you learn more? Go to http://linda-haley.info/. Enter [phone] in the search box to learn more about \"Spondylolysis and Spondylolisthesis: Exercises. \" Current as of: September 20, 2018 Content Version: 11.9 © 3965-6507 [employer]. Care instructions adapted under license by [employer] (which disclaims liability or warranty for this information). If you have questions about a medical condition or this instruction, always ask your healthcare professional. [address] any warranty or liability for your use of this information.

## 2019-11-17 ENCOUNTER — HOSPITAL ENCOUNTER (EMERGENCY)
Age: 54
Discharge: HOME | End: 2019-11-17
Attending: EMERGENCY MEDICINE
Payer: COMMERCIAL

## 2019-11-17 VITALS
HEART RATE: 81 BPM | HEIGHT: 72 IN | DIASTOLIC BLOOD PRESSURE: 94 MMHG | TEMPERATURE: 98 F | RESPIRATION RATE: 22 BRPM | SYSTOLIC BLOOD PRESSURE: 148 MMHG | WEIGHT: 220 LBS | OXYGEN SATURATION: 97 % | BODY MASS INDEX: 29.8 KG/M2

## 2019-11-17 DIAGNOSIS — T78.40XA ALLERGIC REACTION, INITIAL ENCOUNTER: Primary | ICD-10-CM

## 2019-11-17 PROCEDURE — 96375 TX/PRO/DX INJ NEW DRUG ADDON: CPT

## 2019-11-17 PROCEDURE — 96374 THER/PROPH/DIAG INJ IV PUSH: CPT

## 2019-11-17 PROCEDURE — 99284 EMERGENCY DEPT VISIT MOD MDM: CPT

## 2019-11-17 PROCEDURE — 74011250636 HC RX REV CODE- 250/636: Performed by: PHYSICIAN ASSISTANT

## 2019-11-17 RX ORDER — DIPHENHYDRAMINE HCL 25 MG
50 TABLET ORAL
Qty: 50 TAB | Refills: 0 | Status: SHIPPED | OUTPATIENT
Start: 2019-11-17

## 2019-11-17 RX ORDER — FAMOTIDINE 20 MG/1
20 TABLET, FILM COATED ORAL 2 TIMES DAILY
Qty: 20 TAB | Refills: 0 | Status: SHIPPED | OUTPATIENT
Start: 2019-11-17 | End: 2019-11-27

## 2019-11-17 RX ORDER — FAMOTIDINE 10 MG/ML
10 INJECTION INTRAVENOUS
Status: COMPLETED | OUTPATIENT
Start: 2019-11-17 | End: 2019-11-17

## 2019-11-17 RX ORDER — HYDROXYZINE PAMOATE 25 MG/1
50 CAPSULE ORAL
Status: DISCONTINUED | OUTPATIENT
Start: 2019-11-17 | End: 2019-11-17

## 2019-11-17 RX ORDER — PREDNISONE 20 MG/1
20 TABLET ORAL DAILY
Qty: 5 TAB | Refills: 0 | Status: SHIPPED | OUTPATIENT
Start: 2019-11-17 | End: 2019-11-22

## 2019-11-17 RX ORDER — DIPHENHYDRAMINE HYDROCHLORIDE 50 MG/ML
50 INJECTION, SOLUTION INTRAMUSCULAR; INTRAVENOUS
Status: COMPLETED | OUTPATIENT
Start: 2019-11-17 | End: 2019-11-17

## 2019-11-17 RX ORDER — DEXAMETHASONE SODIUM PHOSPHATE 4 MG/ML
10 INJECTION, SOLUTION INTRA-ARTICULAR; INTRALESIONAL; INTRAMUSCULAR; INTRAVENOUS; SOFT TISSUE
Status: COMPLETED | OUTPATIENT
Start: 2019-11-17 | End: 2019-11-17

## 2019-11-17 RX ORDER — EPINEPHRINE 0.3 MG/.3ML
0.3 INJECTION SUBCUTANEOUS
Qty: 2 SYRINGE | Refills: 0 | Status: SHIPPED | OUTPATIENT
Start: 2019-11-17 | End: 2019-11-17

## 2019-11-17 RX ADMIN — SODIUM CHLORIDE 1000 ML: 900 INJECTION, SOLUTION INTRAVENOUS at 13:59

## 2019-11-17 RX ADMIN — DIPHENHYDRAMINE HYDROCHLORIDE 50 MG: 50 INJECTION INTRAMUSCULAR; INTRAVENOUS at 14:08

## 2019-11-17 RX ADMIN — FAMOTIDINE 10 MG: 10 INJECTION INTRAVENOUS at 13:58

## 2019-11-17 RX ADMIN — DEXAMETHASONE SODIUM PHOSPHATE 10 MG: 4 INJECTION, SOLUTION INTRAMUSCULAR; INTRAVENOUS at 13:59

## 2019-11-17 NOTE — ED PROVIDER NOTES
EMERGENCY DEPARTMENT HISTORY AND PHYSICAL EXAM    Date: 11/17/2019  Patient Name: Tyrone Thompson    History of Presenting Illness     Chief Complaint   Patient presents with    Skin Problem         History Provided By: The patient    Chief Complaint: Hives  Duration: 24 hours  Timing: Constant  Location: Diffuse body  Quality: Itchy  Severity: Severe  Modifying Factors: Worse after standing fiberglass  Associated Symptoms: none       Additional History (Context): Tyrone Thompson is a 49 y.o. male with a history of hyperlipidemia and psoriasis who presents today with his wife for diffuse body hives. Patient also states \"I feel like the hives are in my throat\". Patient denies any shortness of breath. Patient states the only thing he can think may have caused this was sanding fiberglass, however he states he has sanded fiberglass many times before without any difficulties. Patient reports he also started a new injectable medication for psoriasis a couple weeks ago but has never had this type of reaction either. Denies any other known causes at this time. Patient states he last had Benadryl 4 hours ago. PCP: Liza Gray MD    Current Facility-Administered Medications   Medication Dose Route Frequency Provider Last Rate Last Dose    sodium chloride 0.9 % bolus infusion 1,000 mL  1,000 mL IntraVENous ONCE Vasquez [place] 1,000 mL/hr at 11/17/19 1359 1,000 mL at 11/17/19 1359     Current Outpatient Medications   Medication Sig Dispense Refill    EPINEPHrine (EPIPEN) 0.3 mg/0.3 mL injection 0.3 mL by IntraMUSCular route once as needed for Anaphylaxis or Allergic Response for up to 1 dose. 2 Syringe 0    famotidine (PEPCID) 20 mg tablet Take 1 Tab by mouth two (2) times a day for 10 days. 20 Tab 0    diphenhydrAMINE (BENADRYL) 25 mg tablet Take 2 Tabs by mouth every four to six (4-6) hours as needed for Itching. 50 Tab 0    predniSONE (DELTASONE) 20 mg tablet Take 20 mg by mouth daily for 5 days.  With Breakfast 5 Tab 0    ENBREL SURECLICK 50 mg/mL (7.03 mL) injection       AMNESTEEM 40 mg capsule       atorvastatin (LIPITOR) 40 mg tablet       fenofibrate nanocrystallized (TRICOR) 48 mg tablet          Past History     Past Medical History:  Past Medical History:   Diagnosis Date    Closed fracture of metacarpal bone(s), site unspecified     Esophageal reflux     in past, about 2010    Hyperlipidemia     Psoriasis        Past Surgical History:  Past Surgical History:   Procedure Laterality Date    HX ORTHOPAEDIC      left hand       Family History:  Family History   Problem Relation Age of Onset    High Cholesterol Other     Diabetes Other     Breast Cancer Other     Cancer Mother         breast    Diabetes Father         type 2    Hypertension Father        Social History:  Social History     Tobacco Use    Smoking status: Never Smoker    Smokeless tobacco: Never Used   Substance Use Topics    Alcohol use: Yes     Comment: 2-3 times/week. ..beer    Drug use: Never       Allergies:  No Known Allergies      Review of Systems   Review of Systems   Constitutional: Negative for chills and fever. HENT: Negative for congestion, rhinorrhea and sore throat. Respiratory: Negative for cough and shortness of breath. Cardiovascular: Negative for chest pain. Gastrointestinal: Negative for abdominal pain, blood in stool, constipation, diarrhea, nausea and vomiting. Genitourinary: Negative for dysuria, frequency and hematuria. Musculoskeletal: Negative for back pain and myalgias. Skin: Positive for rash. Negative for wound. Neurological: Negative for dizziness and headaches. All other systems reviewed and are negative.     All Other Systems Negative  Physical Exam     Vitals:    11/17/19 1411 11/17/19 1415 11/17/19 1431 11/17/19 1445   BP: (!) 148/92 (!) 153/105  (!) 148/94   Pulse:  83 77 79   Resp:  18 24 24   Temp:       SpO2: 98% 99% 98% 97%   Weight:       Height:         Physical Exam Constitutional: He is oriented to person, place, and time. He appears well-developed and well-nourished. No distress. HENT:   Head: Normocephalic and atraumatic. Mouth/Throat: Uvula is midline and oropharynx is clear and moist. No uvula swelling. No oropharyngeal exudate, posterior oropharyngeal edema, posterior oropharyngeal erythema or tonsillar abscesses. No facial swelling, tongue swelling or signs of angioedema   Eyes: Conjunctivae are normal.   Neck: Normal range of motion. Neck supple. Cardiovascular: Normal rate, regular rhythm and normal heart sounds. Pulmonary/Chest: Effort normal and breath sounds normal. No respiratory distress. He exhibits no tenderness. Patient is speaking in full sentences, no respiratory distress. Abdominal: Soft. Bowel sounds are normal. He exhibits no distension. There is no tenderness. There is no rebound and no guarding. Musculoskeletal: Normal range of motion. He exhibits no edema or deformity. Neurological: He is alert and oriented to person, place, and time. Skin: Skin is warm and dry. He is not diaphoretic. Psychiatric: He has a normal mood and affect. Nursing note and vitals reviewed. Diagnostic Study Results     Labs -   No results found for this or any previous visit (from the past 12 hour(s)). Radiologic Studies -   No orders to display     CT Results  (Last 48 hours)    None        CXR Results  (Last 48 hours)    None            Medical Decision Making   I am the first provider for this patient. I reviewed the vital signs, available nursing notes, past medical history, past surgical history, family history and social history. Vital Signs-Reviewed the patient's vital signs. Records Reviewed: Nursing Notes and Old Medical Records     Procedures: None   Procedures    Provider Notes (Medical Decision Making):     differential diagnosis: Allergic reaction, contact dermatitis, anaphylaxis     Plan:  We will order fluids, Pepcid, steroids and Benadryl. 2:51 PM  Patient reports much relief at this time. Rash has improved greatly. Will discharge home with short course of steroids, Pepcid and Benadryl. We will also discharge home with EpiPen given severity of rash upon arrival.  Have strongly encourage patient to call dermatologist in the morning and have discussed concerns for allergy to medication/injections. Patient agrees with the plan and management and states all questions have been thoroughly answered and there are no more remaining questions. MED RECONCILIATION:  Current Facility-Administered Medications   Medication Dose Route Frequency    sodium chloride 0.9 % bolus infusion 1,000 mL  1,000 mL IntraVENous ONCE     Current Outpatient Medications   Medication Sig    EPINEPHrine (EPIPEN) 0.3 mg/0.3 mL injection 0.3 mL by IntraMUSCular route once as needed for Anaphylaxis or Allergic Response for up to 1 dose.  famotidine (PEPCID) 20 mg tablet Take 1 Tab by mouth two (2) times a day for 10 days.  diphenhydrAMINE (BENADRYL) 25 mg tablet Take 2 Tabs by mouth every four to six (4-6) hours as needed for Itching.  predniSONE (DELTASONE) 20 mg tablet Take 20 mg by mouth daily for 5 days. With Breakfast    ENBREL SURECLICK 50 mg/mL (1.35 mL) injection     AMNESTEEM 40 mg capsule     atorvastatin (LIPITOR) 40 mg tablet     fenofibrate nanocrystallized (TRICOR) 48 mg tablet        Disposition:  Home     DISCHARGE NOTE:   Pt has been reexamined. Patient has no new complaints, changes, or physical findings. Care plan outlined and precautions discussed. Results of workup were reviewed with the patient. All medications were reviewed with the patient. All of pt's questions and concerns were addressed. Patient was instructed and agrees to follow up with PCP as well as to return to the ED upon further deterioration. Patient is ready to go home.     Follow-up Information     Follow up With Specialties Details Why Contact Info    [facility] Emergency Medicine  As needed [address] [facility]  [phone]    Anabel Nunez MD [facility] In 1 day  [place] [address]  [phone]            Current Discharge Medication List      START taking these medications    Details   EPINEPHrine (EPIPEN) 0.3 mg/0.3 mL injection 0.3 mL by IntraMUSCular route once as needed for Anaphylaxis or Allergic Response for up to 1 dose. Qty: 2 Syringe, Refills: 0      famotidine (PEPCID) 20 mg tablet Take 1 Tab by mouth two (2) times a day for 10 days. Qty: 20 Tab, Refills: 0      diphenhydrAMINE (BENADRYL) 25 mg tablet Take 2 Tabs by mouth every four to six (4-6) hours as needed for Itching. Qty: 50 Tab, Refills: 0      predniSONE (DELTASONE) 20 mg tablet Take 20 mg by mouth daily for 5 days. With Breakfast  Qty: 5 Tab, Refills: 0                 Diagnosis     Clinical Impression:   1.  Allergic reaction, initial encounter

## 2019-11-17 NOTE — ED NOTES
Written and verbal discharge instructions given. Patient verbalizes understanding of same. Patient denies  further questions about treatment and discharge instructions. Left ED with patent airway and steady gait. Arm band removed shredded.  Patient left ED with 4 RX

## 2019-11-17 NOTE — LETTER
[address] [place] [address] [facility] 
[address] [facility] [id number] [phone] Work/School Note Date: 11/17/2019 To Whom It May concern: 
 
Mignon Blair was seen and treated today in the emergency room by the following provider(s): 
Attending Provider: Blair Sanchez MD 
Physician Assistant: [place]. Mignon Blair may return to work on 11/20/19 Sincerely, [place]

## 2019-11-17 NOTE — DISCHARGE INSTRUCTIONS
Patient Education        Allergic Reaction: Care Instructions  Your Care Instructions    An allergic reaction is an excessive response from your immune system to a medicine, chemical, food, insect bite, or other substance. A reaction can range from mild to life-threatening. Some people have a mild rash, hives, and itching or stomach cramps. In severe reactions, swelling of your tongue and throat can close up your airway so that you cannot breathe. Follow-up care is a key part of your treatment and safety. Be sure to make and go to all appointments, and call your doctor if you are having problems. It's also a good idea to know your test results and keep a list of the medicines you take. How can you care for yourself at home? · If you know what caused your allergic reaction, be sure to avoid it. Your allergy may become more severe each time you have a reaction. · Take an over-the-counter antihistamine, such as cetirizine (Zyrtec) or loratadine (Claritin), to treat mild symptoms. Read and follow directions on the label. Some antihistamines can make you feel sleepy. Do not give antihistamines to a child unless you have checked with your doctor first. Mild symptoms include sneezing or an itchy or runny nose; an itchy mouth; a few hives or mild itching; and mild nausea or stomach discomfort. · Do not scratch hives or a rash. Put a cold, moist towel on them or take cool baths to relieve itching. Put ice packs on hives, swelling, or insect stings for 10 to 15 minutes at a time. Put a thin cloth between the ice pack and your skin. Do not take hot baths or showers. They will make the itching worse. · Your doctor may prescribe a shot of epinephrine to carry with you in case you have a severe reaction. Learn how to give yourself the shot and keep it with you at all times. Make sure it is not .   · Go to the emergency room every time you have a severe reaction, even if you have used your shot of epinephrine and are feeling better. Symptoms can come back after a shot. · Wear medical alert jewelry that lists your allergies. You can buy this at most drugstores. · If your child has a severe allergy, make sure that his or her teachers, babysitters, coaches, and other caregivers know about the allergy. They should have an epinephrine shot, know how and when to give it, and have a plan to take your child to the hospital.  When should you call for help? Give an epinephrine shot if:    · You think you are having a severe allergic reaction.     · You have symptoms in more than one body area, such as mild nausea and an itchy mouth.    After giving an epinephrine shot call 911, even if you feel better.   Call 911 if:    · You have symptoms of a severe allergic reaction. These may include:  ? Sudden raised, red areas (hives) all over your body. ? Swelling of the throat, mouth, lips, or tongue. ? Trouble breathing. ? Passing out (losing consciousness). Or you may feel very lightheaded or suddenly feel weak, confused, or restless.     · You have been given an epinephrine shot, even if you feel better.    Call your doctor now or seek immediate medical care if:    · You have symptoms of an allergic reaction, such as:  ? A rash or hives (raised, red areas on the skin). ? Itching. ? Swelling. ? Belly pain, nausea, or vomiting.    Watch closely for changes in your health, and be sure to contact your doctor if:    · You do not get better as expected. Where can you learn more? Go to http://linda-haley.info/. Enter P032 in the search box to learn more about \"Allergic Reaction: Care Instructions. \"  Current as of: April 7, 2019  Content Version: 12.2  © 2533-4080 [employer] Care instructions adapted under license by [employer] (which disclaims liability or warranty for this information).  If you have questions about a medical condition or this instruction, always ask your healthcare professional. [address] any warranty or liability for your use of this information.

## 2019-11-17 NOTE — ED TRIAGE NOTES
Pt states he broke out in hives yesterday morning. Has them all over and feels them in his throat, C/o itching. Has taken Benadryl without relief. Has been on PCN for a le infection. Stopped yesterday. Had his Embrel shot Friday.  Has been working on his boat using [employer] resin